# Patient Record
Sex: MALE | Race: WHITE | Employment: FULL TIME | URBAN - METROPOLITAN AREA
[De-identification: names, ages, dates, MRNs, and addresses within clinical notes are randomized per-mention and may not be internally consistent; named-entity substitution may affect disease eponyms.]

---

## 2020-02-11 ENCOUNTER — OFFICE VISIT (OUTPATIENT)
Dept: FAMILY MEDICINE CLINIC | Facility: CLINIC | Age: 40
End: 2020-02-11
Payer: COMMERCIAL

## 2020-02-11 VITALS
HEART RATE: 76 BPM | HEIGHT: 65.3 IN | DIASTOLIC BLOOD PRESSURE: 73 MMHG | TEMPERATURE: 98 F | BODY MASS INDEX: 35.39 KG/M2 | WEIGHT: 215 LBS | SYSTOLIC BLOOD PRESSURE: 125 MMHG

## 2020-02-11 DIAGNOSIS — Z00.00 ROUTINE GENERAL MEDICAL EXAMINATION AT A HEALTH CARE FACILITY: Primary | ICD-10-CM

## 2020-02-11 PROCEDURE — 99385 PREV VISIT NEW AGE 18-39: CPT | Performed by: FAMILY MEDICINE

## 2020-02-12 NOTE — PROGRESS NOTES
HPI:    Patient ID: Janna Ly is a 46year old male.     HPI  Patient presents with:  Physical: annual physical   Ear Problem: LT ear feels like movement, nothing comes out, same with RT ear,    Lightheadedness: after getting out of gym will feel ligh Visit:  Requested Prescriptions      No prescriptions requested or ordered in this encounter       Imaging & Referrals:  None       #[id number]

## 2020-02-14 ENCOUNTER — APPOINTMENT (OUTPATIENT)
Dept: LAB | Age: 40
End: 2020-02-14
Attending: FAMILY MEDICINE
Payer: COMMERCIAL

## 2020-02-14 LAB
ALBUMIN SERPL-MCNC: 4.3 G/DL (ref 3.4–5)
ALBUMIN/GLOB SERPL: 1 {RATIO} (ref 1–2)
ALP LIVER SERPL-CCNC: 116 U/L (ref 45–117)
ALT SERPL-CCNC: 41 U/L (ref 16–61)
ANION GAP SERPL CALC-SCNC: 4 MMOL/L (ref 0–18)
AST SERPL-CCNC: 26 U/L (ref 15–37)
BASOPHILS # BLD AUTO: 0.02 X10(3) UL (ref 0–0.2)
BASOPHILS NFR BLD AUTO: 0.3 %
BILIRUB SERPL-MCNC: 0.4 MG/DL (ref 0.1–2)
BUN BLD-MCNC: 16 MG/DL (ref 7–18)
BUN/CREAT SERPL: 16.2 (ref 10–20)
CALCIUM BLD-MCNC: 9.6 MG/DL (ref 8.5–10.1)
CHLORIDE SERPL-SCNC: 106 MMOL/L (ref 98–112)
CHOLEST SMN-MCNC: 221 MG/DL (ref ?–200)
CO2 SERPL-SCNC: 30 MMOL/L (ref 21–32)
CREAT BLD-MCNC: 0.99 MG/DL (ref 0.7–1.3)
DEPRECATED RDW RBC AUTO: 41.7 FL (ref 35.1–46.3)
EOSINOPHIL # BLD AUTO: 0.1 X10(3) UL (ref 0–0.7)
EOSINOPHIL NFR BLD AUTO: 1.3 %
ERYTHROCYTE [DISTWIDTH] IN BLOOD BY AUTOMATED COUNT: 12.8 % (ref 11–15)
GLOBULIN PLAS-MCNC: 4.2 G/DL (ref 2.8–4.4)
GLUCOSE BLD-MCNC: 104 MG/DL (ref 70–99)
HCT VFR BLD AUTO: 48.5 % (ref 39–53)
HDLC SERPL-MCNC: 52 MG/DL (ref 40–59)
HGB BLD-MCNC: 16.1 G/DL (ref 13–17.5)
IMM GRANULOCYTES # BLD AUTO: 0.02 X10(3) UL (ref 0–1)
IMM GRANULOCYTES NFR BLD: 0.3 %
LDLC SERPL CALC-MCNC: 151 MG/DL (ref ?–100)
LYMPHOCYTES # BLD AUTO: 2.07 X10(3) UL (ref 1–4)
LYMPHOCYTES NFR BLD AUTO: 26.7 %
M PROTEIN MFR SERPL ELPH: 8.5 G/DL (ref 6.4–8.2)
MCH RBC QN AUTO: 29.7 PG (ref 26–34)
MCHC RBC AUTO-ENTMCNC: 33.2 G/DL (ref 31–37)
MCV RBC AUTO: 89.5 FL (ref 80–100)
MONOCYTES # BLD AUTO: 0.55 X10(3) UL (ref 0.1–1)
MONOCYTES NFR BLD AUTO: 7.1 %
NEUTROPHILS # BLD AUTO: 4.99 X10 (3) UL (ref 1.5–7.7)
NEUTROPHILS # BLD AUTO: 4.99 X10(3) UL (ref 1.5–7.7)
NEUTROPHILS NFR BLD AUTO: 64.3 %
NONHDLC SERPL-MCNC: 169 MG/DL (ref ?–130)
OSMOLALITY SERPL CALC.SUM OF ELEC: 291 MOSM/KG (ref 275–295)
PATIENT FASTING Y/N/NP: YES
PATIENT FASTING Y/N/NP: YES
PLATELET # BLD AUTO: 286 10(3)UL (ref 150–450)
POTASSIUM SERPL-SCNC: 4.7 MMOL/L (ref 3.5–5.1)
RBC # BLD AUTO: 5.42 X10(6)UL (ref 4.3–5.7)
SODIUM SERPL-SCNC: 140 MMOL/L (ref 136–145)
TRIGL SERPL-MCNC: 90 MG/DL (ref 30–149)
VLDLC SERPL CALC-MCNC: 18 MG/DL (ref 0–30)
WBC # BLD AUTO: 7.8 X10(3) UL (ref 4–11)

## 2020-02-14 PROCEDURE — 80061 LIPID PANEL: CPT | Performed by: FAMILY MEDICINE

## 2020-02-14 PROCEDURE — 36415 COLL VENOUS BLD VENIPUNCTURE: CPT | Performed by: FAMILY MEDICINE

## 2020-02-14 PROCEDURE — 80053 COMPREHEN METABOLIC PANEL: CPT | Performed by: FAMILY MEDICINE

## 2020-02-14 PROCEDURE — 85025 COMPLETE CBC W/AUTO DIFF WBC: CPT | Performed by: FAMILY MEDICINE

## 2021-05-30 ENCOUNTER — IMMUNIZATION (OUTPATIENT)
Dept: LAB | Facility: HOSPITAL | Age: 41
End: 2021-05-30
Attending: EMERGENCY MEDICINE
Payer: COMMERCIAL

## 2021-05-30 DIAGNOSIS — Z23 NEED FOR VACCINATION: Primary | ICD-10-CM

## 2021-05-30 PROCEDURE — 0001A SARSCOV2 VAC 30MCG/0.3ML IM: CPT

## 2021-06-03 ENCOUNTER — HOSPITAL ENCOUNTER (OUTPATIENT)
Dept: GENERAL RADIOLOGY | Age: 41
Discharge: HOME | End: 2021-06-03
Attending: FAMILY MEDICINE
Payer: COMMERCIAL

## 2021-06-03 ENCOUNTER — OFFICE VISIT (OUTPATIENT)
Dept: FAMILY MEDICINE CLINIC | Facility: CLINIC | Age: 41
End: 2021-06-03
Payer: COMMERCIAL

## 2021-06-03 VITALS
WEIGHT: 227 LBS | DIASTOLIC BLOOD PRESSURE: 83 MMHG | RESPIRATION RATE: 20 BRPM | BODY MASS INDEX: 37.82 KG/M2 | SYSTOLIC BLOOD PRESSURE: 129 MMHG | HEIGHT: 65 IN | HEART RATE: 91 BPM | TEMPERATURE: 97 F

## 2021-06-03 DIAGNOSIS — R30.0 DYSURIA: ICD-10-CM

## 2021-06-03 DIAGNOSIS — R30.9 PAINFUL URINATION: Primary | ICD-10-CM

## 2021-06-03 PROCEDURE — 99214 OFFICE O/P EST MOD 30 MIN: CPT | Performed by: FAMILY MEDICINE

## 2021-06-03 PROCEDURE — 74018 RADEX ABDOMEN 1 VIEW: CPT | Performed by: FAMILY MEDICINE

## 2021-06-03 PROCEDURE — 3079F DIAST BP 80-89 MM HG: CPT | Performed by: FAMILY MEDICINE

## 2021-06-03 PROCEDURE — 81002 URINALYSIS NONAUTO W/O SCOPE: CPT | Performed by: FAMILY MEDICINE

## 2021-06-03 PROCEDURE — 3008F BODY MASS INDEX DOCD: CPT | Performed by: FAMILY MEDICINE

## 2021-06-03 PROCEDURE — 3074F SYST BP LT 130 MM HG: CPT | Performed by: FAMILY MEDICINE

## 2021-06-03 RX ORDER — CIPROFLOXACIN 500 MG/1
500 TABLET, FILM COATED ORAL 2 TIMES DAILY
Qty: 10 TABLET | Refills: 0 | Status: SHIPPED | OUTPATIENT
Start: 2021-06-03

## 2021-06-03 NOTE — PROGRESS NOTES
HPI/Subjective:   Patient ID: Tere Hartman is a 45year old male. HPI  Patient presents with:  Pain: LLQ   Constipation  Painful Urination  2 days hist of pelvic pain and pain when finishing urination.    History/Other:   Review of Systems   Constitut

## 2021-06-20 ENCOUNTER — IMMUNIZATION (OUTPATIENT)
Dept: LAB | Facility: HOSPITAL | Age: 41
End: 2021-06-20
Attending: EMERGENCY MEDICINE
Payer: COMMERCIAL

## 2021-06-20 DIAGNOSIS — Z23 NEED FOR VACCINATION: Primary | ICD-10-CM

## 2021-06-20 PROCEDURE — 0002A SARSCOV2 VAC 30MCG/0.3ML IM: CPT

## 2022-05-09 ENCOUNTER — NURSE TRIAGE (OUTPATIENT)
Dept: FAMILY MEDICINE CLINIC | Facility: CLINIC | Age: 42
End: 2022-05-09

## 2022-05-10 RX ORDER — CIPROFLOXACIN 500 MG/1
500 TABLET, FILM COATED ORAL 2 TIMES DAILY
Qty: 10 TABLET | Refills: 0 | OUTPATIENT
Start: 2022-05-10

## 2022-05-14 ENCOUNTER — LAB ENCOUNTER (OUTPATIENT)
Dept: LAB | Facility: HOSPITAL | Age: 42
End: 2022-05-14
Attending: FAMILY MEDICINE
Payer: COMMERCIAL

## 2022-05-14 DIAGNOSIS — R30.9 PAINFUL URINATION: ICD-10-CM

## 2022-05-14 LAB
BILIRUB UR QL: NEGATIVE
CLARITY UR: CLEAR
COLOR UR: YELLOW
GLUCOSE UR-MCNC: NEGATIVE MG/DL
HGB UR QL STRIP.AUTO: NEGATIVE
LEUKOCYTE ESTERASE UR QL STRIP.AUTO: NEGATIVE
NITRITE UR QL STRIP.AUTO: NEGATIVE
PH UR: 5 [PH] (ref 5–8)
PROT UR-MCNC: 30 MG/DL
SP GR UR STRIP: 1.03 (ref 1–1.03)
UROBILINOGEN UR STRIP-ACNC: 2
VIT C UR-MCNC: NEGATIVE MG/DL

## 2022-05-14 PROCEDURE — 87086 URINE CULTURE/COLONY COUNT: CPT

## 2022-05-14 PROCEDURE — 81001 URINALYSIS AUTO W/SCOPE: CPT

## 2023-02-07 ENCOUNTER — LAB ENCOUNTER (OUTPATIENT)
Dept: LAB | Age: 43
End: 2023-02-07
Attending: FAMILY MEDICINE
Payer: COMMERCIAL

## 2023-02-07 ENCOUNTER — OFFICE VISIT (OUTPATIENT)
Dept: FAMILY MEDICINE CLINIC | Facility: CLINIC | Age: 43
End: 2023-02-07

## 2023-02-07 VITALS
HEIGHT: 65 IN | SYSTOLIC BLOOD PRESSURE: 139 MMHG | HEART RATE: 80 BPM | DIASTOLIC BLOOD PRESSURE: 80 MMHG | WEIGHT: 241.19 LBS | BODY MASS INDEX: 40.18 KG/M2

## 2023-02-07 DIAGNOSIS — R10.2 ABDOMINAL PAIN, SUPRAPUBIC: Primary | ICD-10-CM

## 2023-02-07 DIAGNOSIS — K59.01 SLOW TRANSIT CONSTIPATION: ICD-10-CM

## 2023-02-07 DIAGNOSIS — R10.2 ABDOMINAL PAIN, SUPRAPUBIC: ICD-10-CM

## 2023-02-07 LAB
ALBUMIN SERPL-MCNC: 4.2 G/DL (ref 3.4–5)
ALBUMIN/GLOB SERPL: 1 {RATIO} (ref 1–2)
ALP LIVER SERPL-CCNC: 111 U/L
ALT SERPL-CCNC: 97 U/L
ANION GAP SERPL CALC-SCNC: 5 MMOL/L (ref 0–18)
AST SERPL-CCNC: 47 U/L (ref 15–37)
BILIRUB SERPL-MCNC: 0.7 MG/DL (ref 0.1–2)
BILIRUB UR QL: NEGATIVE
BUN BLD-MCNC: 17 MG/DL (ref 7–18)
BUN/CREAT SERPL: 16.2 (ref 10–20)
CALCIUM BLD-MCNC: 9.4 MG/DL (ref 8.5–10.1)
CHLORIDE SERPL-SCNC: 108 MMOL/L (ref 98–112)
CLARITY UR: CLEAR
CO2 SERPL-SCNC: 27 MMOL/L (ref 21–32)
COLOR UR: YELLOW
CREAT BLD-MCNC: 1.05 MG/DL
FASTING STATUS PATIENT QL REPORTED: YES
GFR SERPLBLD BASED ON 1.73 SQ M-ARVRAT: 91 ML/MIN/1.73M2 (ref 60–?)
GLOBULIN PLAS-MCNC: 4.2 G/DL (ref 2.8–4.4)
GLUCOSE BLD-MCNC: 103 MG/DL (ref 70–99)
GLUCOSE UR-MCNC: NEGATIVE MG/DL
HGB UR QL STRIP.AUTO: NEGATIVE
HYALINE CASTS #/AREA URNS AUTO: PRESENT /LPF
KETONES UR-MCNC: NEGATIVE MG/DL
LEUKOCYTE ESTERASE UR QL STRIP.AUTO: NEGATIVE
NITRITE UR QL STRIP.AUTO: NEGATIVE
OSMOLALITY SERPL CALC.SUM OF ELEC: 292 MOSM/KG (ref 275–295)
PH UR: 5.5 [PH] (ref 5–8)
POTASSIUM SERPL-SCNC: 4.6 MMOL/L (ref 3.5–5.1)
PROT SERPL-MCNC: 8.4 G/DL (ref 6.4–8.2)
SODIUM SERPL-SCNC: 140 MMOL/L (ref 136–145)
SP GR UR STRIP: 1.02 (ref 1–1.03)
UROBILINOGEN UR STRIP-ACNC: 0.2

## 2023-02-07 PROCEDURE — 99213 OFFICE O/P EST LOW 20 MIN: CPT | Performed by: FAMILY MEDICINE

## 2023-02-07 PROCEDURE — 80053 COMPREHEN METABOLIC PANEL: CPT

## 2023-02-07 PROCEDURE — 81015 MICROSCOPIC EXAM OF URINE: CPT

## 2023-02-07 PROCEDURE — 3079F DIAST BP 80-89 MM HG: CPT | Performed by: FAMILY MEDICINE

## 2023-02-07 PROCEDURE — 81001 URINALYSIS AUTO W/SCOPE: CPT

## 2023-02-07 PROCEDURE — 36415 COLL VENOUS BLD VENIPUNCTURE: CPT

## 2023-02-07 PROCEDURE — 3075F SYST BP GE 130 - 139MM HG: CPT | Performed by: FAMILY MEDICINE

## 2023-02-07 PROCEDURE — 3008F BODY MASS INDEX DOCD: CPT | Performed by: FAMILY MEDICINE

## 2023-02-07 RX ORDER — POLYETHYLENE GLYCOL 3350 17 G/17G
17 POWDER, FOR SOLUTION ORAL DAILY
Qty: 30 EACH | Refills: 0 | Status: SHIPPED | OUTPATIENT
Start: 2023-02-07

## 2023-02-18 ENCOUNTER — HOSPITAL ENCOUNTER (OUTPATIENT)
Dept: CT IMAGING | Facility: HOSPITAL | Age: 43
Discharge: HOME | End: 2023-02-18
Attending: FAMILY MEDICINE
Payer: COMMERCIAL

## 2023-02-18 DIAGNOSIS — R10.2 ABDOMINAL PAIN, SUPRAPUBIC: ICD-10-CM

## 2023-02-18 PROCEDURE — 74177 CT ABD & PELVIS W/CONTRAST: CPT | Performed by: FAMILY MEDICINE

## 2023-02-22 ENCOUNTER — OFFICE VISIT (OUTPATIENT)
Dept: FAMILY MEDICINE CLINIC | Facility: CLINIC | Age: 43
End: 2023-02-22

## 2023-02-22 VITALS
BODY MASS INDEX: 39.15 KG/M2 | SYSTOLIC BLOOD PRESSURE: 133 MMHG | HEIGHT: 65 IN | DIASTOLIC BLOOD PRESSURE: 86 MMHG | HEART RATE: 80 BPM | WEIGHT: 235 LBS

## 2023-02-22 DIAGNOSIS — K76.0 FATTY LIVER: ICD-10-CM

## 2023-02-22 DIAGNOSIS — K57.90 DIVERTICULOSIS: ICD-10-CM

## 2023-02-22 DIAGNOSIS — R10.9 ABDOMINAL PAIN, UNSPECIFIED ABDOMINAL LOCATION: Primary | ICD-10-CM

## 2023-02-22 PROCEDURE — 3008F BODY MASS INDEX DOCD: CPT | Performed by: FAMILY MEDICINE

## 2023-02-22 PROCEDURE — 3075F SYST BP GE 130 - 139MM HG: CPT | Performed by: FAMILY MEDICINE

## 2023-02-22 PROCEDURE — 99213 OFFICE O/P EST LOW 20 MIN: CPT | Performed by: FAMILY MEDICINE

## 2023-02-22 PROCEDURE — 3079F DIAST BP 80-89 MM HG: CPT | Performed by: FAMILY MEDICINE

## 2023-03-31 ENCOUNTER — OFFICE VISIT (OUTPATIENT)
Dept: FAMILY MEDICINE CLINIC | Facility: CLINIC | Age: 43
End: 2023-03-31

## 2023-03-31 DIAGNOSIS — L91.8 SKIN TAG: Primary | ICD-10-CM

## 2023-03-31 PROCEDURE — 3078F DIAST BP <80 MM HG: CPT | Performed by: FAMILY MEDICINE

## 2023-03-31 PROCEDURE — 11200 RMVL SKIN TAGS UP TO&INC 15: CPT | Performed by: FAMILY MEDICINE

## 2023-03-31 PROCEDURE — 3074F SYST BP LT 130 MM HG: CPT | Performed by: FAMILY MEDICINE

## 2023-03-31 PROCEDURE — 3008F BODY MASS INDEX DOCD: CPT | Performed by: FAMILY MEDICINE

## 2023-04-01 VITALS
BODY MASS INDEX: 37.29 KG/M2 | HEIGHT: 65 IN | WEIGHT: 223.81 LBS | HEART RATE: 72 BPM | SYSTOLIC BLOOD PRESSURE: 120 MMHG | DIASTOLIC BLOOD PRESSURE: 71 MMHG

## 2024-04-02 ENCOUNTER — TELEPHONE (OUTPATIENT)
Dept: FAMILY MEDICINE CLINIC | Facility: CLINIC | Age: 44
End: 2024-04-02

## 2024-04-02 DIAGNOSIS — Z23 NEED FOR VACCINATION: Primary | ICD-10-CM

## 2024-04-02 NOTE — TELEPHONE ENCOUNTER
Patient scheduled an apt for 4/3/24 for Tdap vaccine. No orders on file, please sign order if you agree.     Last seen 3/31/23

## 2024-04-02 NOTE — TELEPHONE ENCOUNTER
Call made, no answer.     If call is returned please inform pt apt for nurse visit needs to be canceled, per Dr can Tdap vaccine can be administered the same day pt scheduled apt for a physical 5/14/24    See message below

## 2024-04-02 NOTE — TELEPHONE ENCOUNTER
FYI: per patient her wife is going to have a baby that's why he's going to have his Tdap tomorrow before his physical on 05/14/2024. Please call patient if he can come tomorrow.

## 2024-04-03 ENCOUNTER — NURSE ONLY (OUTPATIENT)
Dept: FAMILY MEDICINE CLINIC | Facility: CLINIC | Age: 44
End: 2024-04-03
Payer: COMMERCIAL

## 2024-04-03 DIAGNOSIS — Z23 NEED FOR VACCINATION: Primary | ICD-10-CM

## 2024-04-03 PROCEDURE — 90715 TDAP VACCINE 7 YRS/> IM: CPT | Performed by: FAMILY MEDICINE

## 2024-04-03 PROCEDURE — 90471 IMMUNIZATION ADMIN: CPT | Performed by: FAMILY MEDICINE

## 2024-04-03 NOTE — TELEPHONE ENCOUNTER
Phone call made s/t pt advise he can keep nurse visit appointment for 4/3 Tdap vaccine has been approved by Dr Gu. Pt verbalized understanding.

## 2024-05-14 ENCOUNTER — OFFICE VISIT (OUTPATIENT)
Dept: FAMILY MEDICINE CLINIC | Facility: CLINIC | Age: 44
End: 2024-05-14

## 2024-05-14 VITALS
WEIGHT: 239 LBS | DIASTOLIC BLOOD PRESSURE: 88 MMHG | HEART RATE: 80 BPM | HEIGHT: 65 IN | BODY MASS INDEX: 39.82 KG/M2 | SYSTOLIC BLOOD PRESSURE: 139 MMHG

## 2024-05-14 DIAGNOSIS — E66.09 CLASS 2 OBESITY DUE TO EXCESS CALORIES WITHOUT SERIOUS COMORBIDITY WITH BODY MASS INDEX (BMI) OF 39.0 TO 39.9 IN ADULT: ICD-10-CM

## 2024-05-14 DIAGNOSIS — Z00.00 PHYSICAL EXAM: Primary | ICD-10-CM

## 2024-05-14 PROCEDURE — 3075F SYST BP GE 130 - 139MM HG: CPT | Performed by: FAMILY MEDICINE

## 2024-05-14 PROCEDURE — 3008F BODY MASS INDEX DOCD: CPT | Performed by: FAMILY MEDICINE

## 2024-05-14 PROCEDURE — 99396 PREV VISIT EST AGE 40-64: CPT | Performed by: FAMILY MEDICINE

## 2024-05-14 PROCEDURE — 3079F DIAST BP 80-89 MM HG: CPT | Performed by: FAMILY MEDICINE

## 2024-05-14 NOTE — PROGRESS NOTES
5/14/2024  9:16 AM    Andres Hurt is a 43 year old male.    Chief complaint(s):   Chief Complaint   Patient presents with    Routine Physical     HPI:     Andres Hurt primary complaint is regarding CPE.       Andres Hurt is a 43 year old male is here for routine periodic health screening and examination.  His last physical exam was many years ago.  His last ECG was none . His last diabetes screening test was 2 years ago and was normal.   His last cholesterol test was 2 year ago and was normal.  Last dentist visit was 1months ago.  He is current with his Td immunization. Patient last colonoscopy was none. He is not a smoker.        HISTORY:  No past medical history on file.   No past surgical history on file.   Family History   Problem Relation Age of Onset    No Known Problems Father     No Known Problems Mother     No Known Problems Maternal Grandmother     No Known Problems Maternal Grandfather     No Known Problems Paternal Grandmother     No Known Problems Paternal Grandfather     Other (Other) Sister         fibromyalgia       Social History:   Social History     Socioeconomic History    Marital status: Single   Tobacco Use    Smoking status: Never     Passive exposure: Never    Smokeless tobacco: Never   Vaping Use    Vaping status: Never Used   Substance and Sexual Activity    Alcohol use: Yes    Drug use: No        Immunizations:   Immunization History   Administered Date(s) Administered    Covid-19 Vaccine Pfizer 30 mcg/0.3 ml 05/30/2021, 06/20/2021    TDAP 04/03/2024       Medications (Active prior to today's visit):  No current outpatient medications on file.       Allergies:  No Known Allergies      ROS:   Review of Systems   Constitutional:  Negative for appetite change, fatigue and fever.   HENT:  Negative for hearing loss and nosebleeds.    Eyes:  Negative for pain and visual disturbance.   Respiratory:  Negative for apnea and shortness of breath.    Cardiovascular:  Negative for chest  pain, palpitations and leg swelling.   Gastrointestinal:  Negative for abdominal pain, blood in stool, constipation, diarrhea, nausea and vomiting.   Endocrine: Negative for polydipsia and polyuria.   Genitourinary:  Negative for decreased urine volume, frequency and hematuria.        No nocturia   Musculoskeletal:  Negative for arthralgias.   Skin:  Negative for rash.   Neurological:  Negative for dizziness, syncope and headaches.   Psychiatric/Behavioral:  Negative for dysphoric mood and sleep disturbance.        PHYSICAL EXAM:   VS: /88   Pulse 80   Ht 5' 5\" (1.651 m)   Wt 239 lb (108.4 kg)   BMI 39.77 kg/m²     Physical Exam  Vitals reviewed.   Constitutional:       Appearance: Normal appearance.      Comments:      HENT:      Head: Normocephalic.      Right Ear: Hearing, tympanic membrane and ear canal normal.      Left Ear: Hearing, tympanic membrane and ear canal normal.      Nose: Nose normal. No rhinorrhea.      Mouth/Throat:      Mouth: Mucous membranes are moist.      Pharynx: Oropharynx is clear.   Eyes:      Extraocular Movements: Extraocular movements intact.      Conjunctiva/sclera: Conjunctivae normal.      Pupils: Pupils are equal, round, and reactive to light.   Neck:      Thyroid: No thyroid mass.      Vascular: No carotid bruit.   Cardiovascular:      Rate and Rhythm: Normal rate and regular rhythm.      Heart sounds: Normal heart sounds, S1 normal and S2 normal. No murmur heard.  Pulmonary:      Effort: Pulmonary effort is normal.      Breath sounds: Normal breath sounds.   Abdominal:      General: Abdomen is flat. Bowel sounds are normal.      Palpations: Abdomen is soft. There is no hepatomegaly, splenomegaly or mass.      Tenderness: There is no abdominal tenderness.      Hernia: No hernia is present.   Musculoskeletal:      Cervical back: Neck supple.      Comments: Spinal exam without scoliosis.      Lymphadenopathy:      Cervical: No cervical adenopathy.   Skin:     General: Skin  is warm.      Findings: No rash.   Neurological:      General: No focal deficit present.      Mental Status: He is alert.      Deep Tendon Reflexes:      Reflex Scores:       Patellar reflexes are 2+ on the right side and 2+ on the left side.  Psychiatric:         Attention and Perception: Attention normal.         Mood and Affect: Mood and affect normal.         LABORATORY RESULTS:       EKG / Spirometry : -     Radiology: No results found.     ASSESSMENT/PLAN:   Assessment   Encounter Diagnoses   Name Primary?    Physical exam Yes    Class 2 obesity due to excess calories without serious comorbidity with body mass index (BMI) of 39.0 to 39.9 in adult          CPE PLAN:    LABS / TEST & ORDERS for today's visit :Blood test(s) ordered today for send out to [facility] lab:  Orders Placed This Encounter   Procedures    CBC With Differential With Platelet    Comp Metabolic Panel (14)    Hemoglobin A1C    Lipid Panel    TSH W Reflex To Free T4    Vitamin D    Urinalysis with Culture Reflex   Referrals: EKG 12-LEAD  In-House; Urine dip.  Test/Procedures done today include: EKG.    IMMUNIZATIONS: none given today.    RECOMMENDATIONS given include: ANTICIPATORY GUIDANCE  topics covered today include: safety (i.e. seat belts, helmets, sunscreen, protective sports gear ), nutrition (i.e. healthy meals and snacks (i.e. avoid junk food and high-carbohydrate foods); athletic conditioning, fluids; low fat milk, limit to less than 20 oz. a day; dental care ), and Healthy habits& Social competence & Responsibilities: Recommendations on physical activity; exercise daily or at least 3 times a week for 30-60 minutes doing cardiovascular exercise. Encourage to maintain the best physical and dental hygiene possible.  Consider a  if overweight and/or having difficult in staying active; attempt to keep a schedule that includes an adequate sleep and physical exercise / activities Patient educated on doing regular  self testicular exam. Patient was educated on sexual transmitted disease. Best to abstain from sexual intercourse until he is ready to form a family. Use of condoms may prevent transmission of infections as well as pregnancy.    FOLLOW-UP: Schedule a follow-up visit in 12 months.          Orders This Visit:  Orders Placed This Encounter   Procedures    CBC With Differential With Platelet    Comp Metabolic Panel (14)    Hemoglobin A1C    Lipid Panel    TSH W Reflex To Free T4    Vitamin D    Urinalysis with Culture Reflex       Meds This Visit:  Requested Prescriptions      No prescriptions requested or ordered in this encounter       Imaging & Referrals:  EKG 12-LEAD         SEVEN FRANCOIS MD

## 2024-05-16 ENCOUNTER — LAB ENCOUNTER (OUTPATIENT)
Dept: LAB | Age: 44
End: 2024-05-16
Attending: FAMILY MEDICINE

## 2024-05-16 ENCOUNTER — EKG ENCOUNTER (OUTPATIENT)
Dept: LAB | Age: 44
End: 2024-05-16
Attending: FAMILY MEDICINE

## 2024-05-16 DIAGNOSIS — Z00.00 PHYSICAL EXAM: ICD-10-CM

## 2024-05-16 LAB
ALBUMIN SERPL-MCNC: 4.6 G/DL (ref 3.2–4.8)
ALBUMIN/GLOB SERPL: 1.2 {RATIO} (ref 1–2)
ALP LIVER SERPL-CCNC: 114 U/L
ALT SERPL-CCNC: 36 U/L
ANION GAP SERPL CALC-SCNC: 7 MMOL/L (ref 0–18)
AST SERPL-CCNC: 26 U/L (ref ?–34)
ATRIAL RATE: 74 BPM
BASOPHILS # BLD AUTO: 0.04 X10(3) UL (ref 0–0.2)
BASOPHILS NFR BLD AUTO: 0.5 %
BILIRUB SERPL-MCNC: 0.7 MG/DL (ref 0.3–1.2)
BILIRUB UR QL: NEGATIVE
BUN BLD-MCNC: 12 MG/DL (ref 9–23)
BUN/CREAT SERPL: 12.5 (ref 10–20)
CALCIUM BLD-MCNC: 9.4 MG/DL (ref 8.7–10.4)
CHLORIDE SERPL-SCNC: 108 MMOL/L (ref 98–112)
CHOLEST SERPL-MCNC: 214 MG/DL (ref ?–200)
CLARITY UR: CLEAR
CO2 SERPL-SCNC: 26 MMOL/L (ref 21–32)
CREAT BLD-MCNC: 0.96 MG/DL
DEPRECATED RDW RBC AUTO: 44.5 FL (ref 35.1–46.3)
EGFRCR SERPLBLD CKD-EPI 2021: 101 ML/MIN/1.73M2 (ref 60–?)
EOSINOPHIL # BLD AUTO: 0.06 X10(3) UL (ref 0–0.7)
EOSINOPHIL NFR BLD AUTO: 0.7 %
ERYTHROCYTE [DISTWIDTH] IN BLOOD BY AUTOMATED COUNT: 13.7 % (ref 11–15)
EST. AVERAGE GLUCOSE BLD GHB EST-MCNC: 100 MG/DL (ref 68–126)
FASTING PATIENT LIPID ANSWER: YES
FASTING STATUS PATIENT QL REPORTED: YES
GLOBULIN PLAS-MCNC: 3.7 G/DL (ref 2–3.5)
GLUCOSE BLD-MCNC: 95 MG/DL (ref 70–99)
GLUCOSE UR-MCNC: NORMAL MG/DL
HBA1C MFR BLD: 5.1 % (ref ?–5.7)
HCT VFR BLD AUTO: 49.9 %
HDLC SERPL-MCNC: 40 MG/DL (ref 40–59)
HGB BLD-MCNC: 16 G/DL
IMM GRANULOCYTES # BLD AUTO: 0.02 X10(3) UL (ref 0–1)
IMM GRANULOCYTES NFR BLD: 0.2 %
KETONES UR-MCNC: NEGATIVE MG/DL
LDLC SERPL CALC-MCNC: 145 MG/DL (ref ?–100)
LEUKOCYTE ESTERASE UR QL STRIP.AUTO: NEGATIVE
LYMPHOCYTES # BLD AUTO: 2.37 X10(3) UL (ref 1–4)
LYMPHOCYTES NFR BLD AUTO: 26.8 %
MCH RBC QN AUTO: 28.4 PG (ref 26–34)
MCHC RBC AUTO-ENTMCNC: 32.1 G/DL (ref 31–37)
MCV RBC AUTO: 88.6 FL
MONOCYTES # BLD AUTO: 0.51 X10(3) UL (ref 0.1–1)
MONOCYTES NFR BLD AUTO: 5.8 %
NEUTROPHILS # BLD AUTO: 5.84 X10 (3) UL (ref 1.5–7.7)
NEUTROPHILS # BLD AUTO: 5.84 X10(3) UL (ref 1.5–7.7)
NEUTROPHILS NFR BLD AUTO: 66 %
NITRITE UR QL STRIP.AUTO: NEGATIVE
NONHDLC SERPL-MCNC: 174 MG/DL (ref ?–130)
OSMOLALITY SERPL CALC.SUM OF ELEC: 292 MOSM/KG (ref 275–295)
P AXIS: 19 DEGREES
P-R INTERVAL: 190 MS
PH UR: 5 [PH] (ref 5–8)
PLATELET # BLD AUTO: 313 10(3)UL (ref 150–450)
POTASSIUM SERPL-SCNC: 4.2 MMOL/L (ref 3.5–5.1)
PROT SERPL-MCNC: 8.3 G/DL (ref 5.7–8.2)
PROT UR-MCNC: NEGATIVE MG/DL
Q-T INTERVAL: 368 MS
QRS DURATION: 102 MS
QTC CALCULATION (BEZET): 408 MS
R AXIS: 48 DEGREES
RBC # BLD AUTO: 5.63 X10(6)UL
SODIUM SERPL-SCNC: 141 MMOL/L (ref 136–145)
SP GR UR STRIP: 1.02 (ref 1–1.03)
T AXIS: 51 DEGREES
TRIGL SERPL-MCNC: 160 MG/DL (ref 30–149)
TSI SER-ACNC: 2.01 MIU/ML (ref 0.55–4.78)
UROBILINOGEN UR STRIP-ACNC: NORMAL
VENTRICULAR RATE: 74 BPM
VIT D+METAB SERPL-MCNC: 17 NG/ML (ref 30–100)
VLDLC SERPL CALC-MCNC: 30 MG/DL (ref 0–30)
WBC # BLD AUTO: 8.8 X10(3) UL (ref 4–11)

## 2024-05-16 PROCEDURE — 84443 ASSAY THYROID STIM HORMONE: CPT

## 2024-05-16 PROCEDURE — 85025 COMPLETE CBC W/AUTO DIFF WBC: CPT

## 2024-05-16 PROCEDURE — 81001 URINALYSIS AUTO W/SCOPE: CPT

## 2024-05-16 PROCEDURE — 80053 COMPREHEN METABOLIC PANEL: CPT

## 2024-05-16 PROCEDURE — 36415 COLL VENOUS BLD VENIPUNCTURE: CPT

## 2024-05-16 PROCEDURE — 83036 HEMOGLOBIN GLYCOSYLATED A1C: CPT

## 2024-05-16 PROCEDURE — 82306 VITAMIN D 25 HYDROXY: CPT

## 2024-05-16 PROCEDURE — 93005 ELECTROCARDIOGRAM TRACING: CPT

## 2024-05-16 PROCEDURE — 93010 ELECTROCARDIOGRAM REPORT: CPT | Performed by: STUDENT IN AN ORGANIZED HEALTH CARE EDUCATION/TRAINING PROGRAM

## 2024-05-16 PROCEDURE — 80061 LIPID PANEL: CPT

## 2024-05-16 RX ORDER — ERGOCALCIFEROL 1.25 MG/1
50000 CAPSULE ORAL WEEKLY
Qty: 12 CAPSULE | Refills: 3 | Status: SHIPPED | OUTPATIENT
Start: 2024-05-16 | End: 2025-05-16

## 2024-06-03 ENCOUNTER — NURSE TRIAGE (OUTPATIENT)
Dept: FAMILY MEDICINE CLINIC | Facility: CLINIC | Age: 44
End: 2024-06-03

## 2024-06-03 NOTE — TELEPHONE ENCOUNTER
Please reply to pool: EM RN TRIAGE  Action Requested: Summary for Provider     []  Critical Lab, Recommendations Needed  [] Need Additional Advice  [x]   FYI    []   Need Orders  [] Need Medications Sent to Pharmacy  []  Other     SUMMARY: Patient contacted regarding appointment for lower abdominal pain.  Symptoms present x 1 week.  Describes pain in the center, lower abdomen.  It occurs at the end of urination and then resolves.  Denies burning with urination, dark urine or hematuria.  He is urinating more frequently than usual.  Denies fever, body aches, chills, nausea, vomiting or constipation.  Acute visit moved to tomorrow 06/04.  Patient advised to proceed to immediate care or emergency room for any symptom progression prior to appointment. He verbalized understanding and compliance.     Reason for call: Acute  Onset: Data Unavailable                       Reason for Disposition   MILD pain (e.g., does not interfere with normal activities) and pain comes and goes (cramps) lasts > 48 hours  (Exception: This same abdominal pain is a chronic symptom recurrent or ongoing AND present > 4 weeks.)    Protocols used: Abdominal Pain - Male-A-[place]

## 2024-06-03 NOTE — TELEPHONE ENCOUNTER
Patient scheduled a mychart appointment noting the following on appointment notes    I am having lower abdomen pains. Painful urination and bowel movements    6/3/24 called patient and transfered to RN Triage - it appears the call transferred was not completed for triage.    Mychart message sent to patient.

## 2024-06-04 ENCOUNTER — OFFICE VISIT (OUTPATIENT)
Dept: INTERNAL MEDICINE CLINIC | Facility: CLINIC | Age: 44
End: 2024-06-04
Payer: COMMERCIAL

## 2024-06-04 VITALS
WEIGHT: 243 LBS | BODY MASS INDEX: 40.48 KG/M2 | DIASTOLIC BLOOD PRESSURE: 87 MMHG | SYSTOLIC BLOOD PRESSURE: 130 MMHG | HEART RATE: 78 BPM | HEIGHT: 65 IN | RESPIRATION RATE: 16 BRPM

## 2024-06-04 DIAGNOSIS — R10.32 LEFT LOWER QUADRANT ABDOMINAL PAIN: Primary | ICD-10-CM

## 2024-06-04 PROCEDURE — 3079F DIAST BP 80-89 MM HG: CPT | Performed by: NURSE PRACTITIONER

## 2024-06-04 PROCEDURE — 99204 OFFICE O/P NEW MOD 45 MIN: CPT | Performed by: NURSE PRACTITIONER

## 2024-06-04 PROCEDURE — 3008F BODY MASS INDEX DOCD: CPT | Performed by: NURSE PRACTITIONER

## 2024-06-04 PROCEDURE — 3075F SYST BP GE 130 - 139MM HG: CPT | Performed by: NURSE PRACTITIONER

## 2024-06-04 NOTE — PROGRESS NOTES
Andres Hurt is a 43 year old male.  Chief Complaint   Patient presents with    Pain     Lower abd pain      HPI:   He presents with lower abdominal pain.   No fevers.   The pain started on Saturday.   He rates the pain a 5/10.   He is taking motrin and tylenol with little relief.  The pain increases when having a BM and urinating.   No blood in the urine.   He has had urinary frequency.    He has had similar symptoms last year.  He had a CT scan of the abdomen/pelvis completed which was negative for any acute process.   Current Outpatient Medications   Medication Sig Dispense Refill    ergocalciferol 1.25 MG (78581 UT) Oral Cap Take 1 capsule (50,000 Units total) by mouth once a week. 12 capsule 3      No past medical history on file.   No past surgical history on file.   Social History:  Social History     Socioeconomic History    Marital status: Single   Tobacco Use    Smoking status: Never     Passive exposure: Never    Smokeless tobacco: Never   Vaping Use    Vaping status: Never Used   Substance and Sexual Activity    Alcohol use: Yes    Drug use: No      Family History   Problem Relation Age of Onset    No Known Problems Father     No Known Problems Mother     No Known Problems Maternal Grandmother     No Known Problems Maternal Grandfather     No Known Problems Paternal Grandmother     No Known Problems Paternal Grandfather     Other (Other) Sister         fibromyalgia       No Known Allergies     REVIEW OF SYSTEMS:     Review of Systems   Constitutional:  Negative for fever.   HENT: Negative.     Respiratory:  Negative for cough, shortness of breath and wheezing.    Cardiovascular:  Negative for chest pain.   Gastrointestinal:  Positive for abdominal pain (lower abdominal pain). Negative for constipation, diarrhea, nausea and vomiting.   Genitourinary:  Positive for frequency. Negative for dysuria and hematuria.   Musculoskeletal: Negative.    Skin: Negative.    Neurological: Negative.     Psychiatric/Behavioral: Negative.        Wt Readings from Last 5 Encounters:   06/04/24 243 lb (110.2 kg)   05/14/24 239 lb (108.4 kg)   03/31/23 223 lb 12.8 oz (101.5 kg)   02/22/23 235 lb (106.6 kg)   02/07/23 241 lb 3.2 oz (109.4 kg)     Body mass index is 40.44 kg/m².      EXAM:   /87   Pulse 78   Resp 16   Ht 5' 5\" (1.651 m)   Wt 243 lb (110.2 kg)   BMI 40.44 kg/m²     Physical Exam  Vitals reviewed.   Constitutional:       Appearance: Normal appearance.   HENT:      Head: Normocephalic.   Cardiovascular:      Rate and Rhythm: Normal rate and regular rhythm.      Pulses: Normal pulses.   Pulmonary:      Breath sounds: Normal breath sounds. No wheezing.   Abdominal:      General: Bowel sounds are normal.      Palpations: Abdomen is soft.      Tenderness: There is abdominal tenderness in the left lower quadrant.   Musculoskeletal:         General: No swelling. Normal range of motion.   Skin:     General: Skin is warm and dry.   Neurological:      Mental Status: He is alert and oriented to person, place, and time.   Psychiatric:         Mood and Affect: Mood normal.         Behavior: Behavior normal.            ASSESSMENT AND PLAN:   1. Left lower quadrant abdominal pain  - CBC With Differential With Platelet; Future  - Comp Metabolic Panel (14); Future  - Urinalysis with Culture Reflex  - CT ABDOMEN+PELVIS(CONTRAST ONLY)(CPT=74177); Future    Total time spent with patient and reviewing the chart: 30 minutes     The patient indicates understanding of these issues and agrees to the plan.  Return for if symptoms do not resolve.

## 2024-06-05 ENCOUNTER — LAB ENCOUNTER (OUTPATIENT)
Dept: LAB | Age: 44
End: 2024-06-05
Attending: NURSE PRACTITIONER
Payer: COMMERCIAL

## 2024-06-05 DIAGNOSIS — R10.32 LEFT LOWER QUADRANT ABDOMINAL PAIN: ICD-10-CM

## 2024-06-05 LAB
ALBUMIN SERPL-MCNC: 4.8 G/DL (ref 3.2–4.8)
ALBUMIN/GLOB SERPL: 1.5 {RATIO} (ref 1–2)
ALP LIVER SERPL-CCNC: 114 U/L
ALT SERPL-CCNC: 46 U/L
ANION GAP SERPL CALC-SCNC: 7 MMOL/L (ref 0–18)
AST SERPL-CCNC: 32 U/L (ref ?–34)
BASOPHILS # BLD AUTO: 0.03 X10(3) UL (ref 0–0.2)
BASOPHILS NFR BLD AUTO: 0.3 %
BILIRUB SERPL-MCNC: 0.5 MG/DL (ref 0.3–1.2)
BILIRUB UR QL: NEGATIVE
BUN BLD-MCNC: 14 MG/DL (ref 9–23)
BUN/CREAT SERPL: 13.9 (ref 10–20)
CALCIUM BLD-MCNC: 9.2 MG/DL (ref 8.7–10.4)
CHLORIDE SERPL-SCNC: 110 MMOL/L (ref 98–112)
CLARITY UR: CLEAR
CO2 SERPL-SCNC: 26 MMOL/L (ref 21–32)
COLOR UR: YELLOW
CREAT BLD-MCNC: 1.01 MG/DL
DEPRECATED RDW RBC AUTO: 41.1 FL (ref 35.1–46.3)
EGFRCR SERPLBLD CKD-EPI 2021: 95 ML/MIN/1.73M2 (ref 60–?)
EOSINOPHIL # BLD AUTO: 0.03 X10(3) UL (ref 0–0.7)
EOSINOPHIL NFR BLD AUTO: 0.3 %
ERYTHROCYTE [DISTWIDTH] IN BLOOD BY AUTOMATED COUNT: 13.2 % (ref 11–15)
FASTING STATUS PATIENT QL REPORTED: NO
GLOBULIN PLAS-MCNC: 3.3 G/DL (ref 2–3.5)
GLUCOSE BLD-MCNC: 98 MG/DL (ref 70–99)
GLUCOSE UR-MCNC: NORMAL MG/DL
HCT VFR BLD AUTO: 45.4 %
HGB BLD-MCNC: 15.5 G/DL
HGB UR QL STRIP.AUTO: NEGATIVE
IMM GRANULOCYTES # BLD AUTO: 0.02 X10(3) UL (ref 0–1)
IMM GRANULOCYTES NFR BLD: 0.2 %
KETONES UR-MCNC: NEGATIVE MG/DL
LEUKOCYTE ESTERASE UR QL STRIP.AUTO: NEGATIVE
LYMPHOCYTES # BLD AUTO: 2.21 X10(3) UL (ref 1–4)
LYMPHOCYTES NFR BLD AUTO: 22.4 %
MCH RBC QN AUTO: 29.6 PG (ref 26–34)
MCHC RBC AUTO-ENTMCNC: 34.1 G/DL (ref 31–37)
MCV RBC AUTO: 86.6 FL
MONOCYTES # BLD AUTO: 0.69 X10(3) UL (ref 0.1–1)
MONOCYTES NFR BLD AUTO: 7 %
NEUTROPHILS # BLD AUTO: 6.89 X10 (3) UL (ref 1.5–7.7)
NEUTROPHILS # BLD AUTO: 6.89 X10(3) UL (ref 1.5–7.7)
NEUTROPHILS NFR BLD AUTO: 69.8 %
NITRITE UR QL STRIP.AUTO: NEGATIVE
OSMOLALITY SERPL CALC.SUM OF ELEC: 296 MOSM/KG (ref 275–295)
PH UR: 6.5 [PH] (ref 5–8)
PLATELET # BLD AUTO: 299 10(3)UL (ref 150–450)
POTASSIUM SERPL-SCNC: 4.2 MMOL/L (ref 3.5–5.1)
PROT SERPL-MCNC: 8.1 G/DL (ref 5.7–8.2)
RBC # BLD AUTO: 5.24 X10(6)UL
SODIUM SERPL-SCNC: 143 MMOL/L (ref 136–145)
SP GR UR STRIP: 1.03 (ref 1–1.03)
UROBILINOGEN UR STRIP-ACNC: 2
WBC # BLD AUTO: 9.9 X10(3) UL (ref 4–11)

## 2024-06-05 PROCEDURE — 81003 URINALYSIS AUTO W/O SCOPE: CPT | Performed by: NURSE PRACTITIONER

## 2024-06-05 PROCEDURE — 36415 COLL VENOUS BLD VENIPUNCTURE: CPT

## 2024-06-05 PROCEDURE — 85025 COMPLETE CBC W/AUTO DIFF WBC: CPT

## 2024-06-05 PROCEDURE — 80053 COMPREHEN METABOLIC PANEL: CPT

## 2024-06-29 ENCOUNTER — HOSPITAL ENCOUNTER (OUTPATIENT)
Dept: CT IMAGING | Age: 44
Discharge: HOME | End: 2024-06-29
Attending: NURSE PRACTITIONER
Payer: COMMERCIAL

## 2024-06-29 DIAGNOSIS — R10.32 LEFT LOWER QUADRANT ABDOMINAL PAIN: ICD-10-CM

## 2024-06-29 PROCEDURE — 74177 CT ABD & PELVIS W/CONTRAST: CPT | Performed by: NURSE PRACTITIONER

## 2024-10-15 ENCOUNTER — HOSPITAL ENCOUNTER (OUTPATIENT)
Dept: GENERAL RADIOLOGY | Age: 44
Discharge: HOME | End: 2024-10-15
Attending: NURSE PRACTITIONER
Payer: COMMERCIAL

## 2024-10-15 ENCOUNTER — OFFICE VISIT (OUTPATIENT)
Dept: INTERNAL MEDICINE CLINIC | Facility: CLINIC | Age: 44
End: 2024-10-15

## 2024-10-15 VITALS
DIASTOLIC BLOOD PRESSURE: 78 MMHG | SYSTOLIC BLOOD PRESSURE: 121 MMHG | HEART RATE: 83 BPM | BODY MASS INDEX: 41.15 KG/M2 | HEIGHT: 65 IN | WEIGHT: 247 LBS

## 2024-10-15 DIAGNOSIS — M54.42 CHRONIC LEFT-SIDED LOW BACK PAIN WITH LEFT-SIDED SCIATICA: Primary | ICD-10-CM

## 2024-10-15 DIAGNOSIS — G89.29 CHRONIC LEFT-SIDED LOW BACK PAIN WITH LEFT-SIDED SCIATICA: Primary | ICD-10-CM

## 2024-10-15 DIAGNOSIS — M54.42 CHRONIC LEFT-SIDED LOW BACK PAIN WITH LEFT-SIDED SCIATICA: ICD-10-CM

## 2024-10-15 DIAGNOSIS — G89.29 CHRONIC LEFT-SIDED LOW BACK PAIN WITH LEFT-SIDED SCIATICA: ICD-10-CM

## 2024-10-15 DIAGNOSIS — B07.9 VERRUCA VULGARIS: ICD-10-CM

## 2024-10-15 PROCEDURE — 72110 X-RAY EXAM L-2 SPINE 4/>VWS: CPT | Performed by: NURSE PRACTITIONER

## 2024-10-15 PROCEDURE — 3078F DIAST BP <80 MM HG: CPT | Performed by: NURSE PRACTITIONER

## 2024-10-15 PROCEDURE — 3008F BODY MASS INDEX DOCD: CPT | Performed by: NURSE PRACTITIONER

## 2024-10-15 PROCEDURE — 3074F SYST BP LT 130 MM HG: CPT | Performed by: NURSE PRACTITIONER

## 2024-10-15 PROCEDURE — 99214 OFFICE O/P EST MOD 30 MIN: CPT | Performed by: NURSE PRACTITIONER

## 2024-10-15 NOTE — PROGRESS NOTES
Andres Hurt is a 44 year old male.  Chief Complaint   Patient presents with    Back Pain     On going issue for 2 year with injury but now pain is getting worse     HPI:   He presents with left sided lower back pain that radiates down his left leg. He has had pain for about 2 years. He had a work injury about 2 years ago. He did not have any imaging at the time of injury. He did not do PT. He has just taken medication for his symptoms.     The pain has been worsening. He is taking tylenol and or ibuprofen as needed for pain with some relief.     The pain increases with walking. He has increased in the morning. He states he feels stiff. He has increased pain with bending over.     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     Current Outpatient Medications   Medication Sig Dispense Refill    ergocalciferol 1.25 MG (65348 UT) Oral Cap Take 1 capsule (50,000 Units total) by mouth once a week. 12 capsule 3      No past medical history on file.   No past surgical history on file.   Social History:  Social History     Socioeconomic History    Marital status: Single   Tobacco Use    Smoking status: Never     Passive exposure: Never    Smokeless tobacco: Never   Vaping Use    Vaping status: Never Used   Substance and Sexual Activity    Alcohol use: Yes    Drug use: No      Family History   Problem Relation Age of Onset    No Known Problems Father     No Known Problems Mother     No Known Problems Maternal Grandmother     No Known Problems Maternal Grandfather     No Known Problems Paternal Grandmother     No Known Problems Paternal Grandfather     Other (Other) Sister         fibromyalgia       Allergies[1]     REVIEW OF SYSTEMS:     Review of Systems   Constitutional:  Negative for fever.   HENT: Negative.     Respiratory:  Negative for cough, shortness of breath and wheezing.    Cardiovascular:  Negative for chest pain.   Gastrointestinal: Negative.    Genitourinary: Negative.     Musculoskeletal:  Positive for back pain.   Skin:         Warts on bilateral index fingers (present for 3-4 yrs)   Neurological: Negative.    Psychiatric/Behavioral: Negative.        Wt Readings from Last 5 Encounters:   10/15/24 247 lb (112 kg)   06/04/24 243 lb (110.2 kg)   05/14/24 239 lb (108.4 kg)   03/31/23 223 lb 12.8 oz (101.5 kg)   02/22/23 235 lb (106.6 kg)     Body mass index is 41.1 kg/m².      EXAM:   /78 (BP Location: Right arm, Patient Position: Sitting, Cuff Size: large)   Pulse 83   Ht 5' 5\" (1.651 m)   Wt 247 lb (112 kg)   BMI 41.10 kg/m²     Physical Exam  Vitals reviewed.   Constitutional:       Appearance: Normal appearance.   HENT:      Head: Normocephalic.   Cardiovascular:      Rate and Rhythm: Normal rate and regular rhythm.      Pulses: Normal pulses.   Pulmonary:      Breath sounds: Normal breath sounds. No wheezing.   Musculoskeletal:         General: No swelling.      Lumbar back: No spasms or tenderness. Decreased range of motion.        Back:    Skin:     General: Skin is warm and dry.   Neurological:      Mental Status: He is alert and oriented to person, place, and time.   Psychiatric:         Mood and Affect: Mood normal.         Behavior: Behavior normal.            ASSESSMENT AND PLAN:   1. Chronic left-sided low back pain with left-sided sciatica  - ok to continue ibuprofen/tylenol as needed   - further recommendations based on imaging results.   - Physical Therapy Referral - [place] Locations  - XR LUMBAR SPINE (MIN 4 VIEWS) (CPT=72110); Future    2. Verruca vulgaris  - Derm Referral - In Network      The patient indicates understanding of these issues and agrees to the plan.  Return for if symptoms do not resolve.       [1] No Known Allergies

## 2024-10-23 ENCOUNTER — OFFICE VISIT (OUTPATIENT)
Dept: DERMATOLOGY CLINIC | Facility: CLINIC | Age: 44
End: 2024-10-23
Payer: COMMERCIAL

## 2024-10-23 DIAGNOSIS — B07.9 VERRUCA VULGARIS: Primary | ICD-10-CM

## 2024-10-23 PROCEDURE — 99203 OFFICE O/P NEW LOW 30 MIN: CPT | Performed by: STUDENT IN AN ORGANIZED HEALTH CARE EDUCATION/TRAINING PROGRAM

## 2024-10-23 NOTE — PROGRESS NOTES
October 23, 2024    New Patient    Referred by: Celina Drew APRN     CHIEF COMPLAINT: Lesion of concern     HISTORY OF PRESENT ILLNESS: Andres Hurt is a 44 year old male here for evaluation of lesion of concern.    Growth   Location: B/L Pointer Fingers  Duration: Years   Signs and symptoms: comes and goes  Current treatment: None  Past treatments: None       Personal Dermatologic History  History of skin cancer: No  History of  atypical moles: No    FAMILY HISTORY:  History of melanoma: No    Past Medical History  No past medical history on file.    REVIEW OF SYSTEMS:  Constitutional: Denies fever, chills, unintentional weight loss.   Skin as per HPI    Medications  Current Outpatient Medications   Medication Sig Dispense Refill    ergocalciferol 1.25 MG (55033 UT) Oral Cap Take 1 capsule (50,000 Units total) by mouth once a week. 12 capsule 3       PHYSICAL EXAM:  General: awake, alert, no acute distress  Neuropsych: appropriate mood and affect  Eyes: Sclerae anicteric, without conjunctival injection, eyelids unremarkable  Skin: Skin exam was performed today including the following: fingers. Pertinent findings include:   - with verrucous papules    ASSESSMENT & PLAN:  Pathophysiology of diagnoses discussed with patient.  Therapeutic options reviewed. Risks, benefits, and alternatives discussed with patient. Instructions reviewed at length.    #Verruca Vulgaris  - Start Efudex+salicylic acid compound to affected arear one nightly. Sent to [facility] pharmacy    - Cryotherapy today. Medically necessary as lesion inflamed and irritated.    - Cryosurgery of non-malignant lesion(s)  - Risks, benefits, alternatives and personnel required for cryosurgery reviewed with patient. Discussed the expected redness, as well as the risks of swelling, blistering, crusting, pigmentary changes, and permanent scarring. . Discussed that lesion may need additional treatments in future or may recur. Pt verbalizes  understanding and wishes to proceed.   - Cryosurgery performed with Liquid Nitrogen via cryostat spray gun to wart(s). 7 lesion(s) treated.   - Patient tolerated well and wound care discussed.        Return to clinic: 6 weeks or sooner if something concerning arises     Jeff Reyes MD

## 2024-11-06 ENCOUNTER — TELEPHONE (OUTPATIENT)
Dept: PHYSICAL THERAPY | Facility: HOSPITAL | Age: 44
End: 2024-11-06

## 2024-11-07 ENCOUNTER — OFFICE VISIT (OUTPATIENT)
Dept: PHYSICAL THERAPY | Age: 44
End: 2024-11-07
Attending: NURSE PRACTITIONER
Payer: COMMERCIAL

## 2024-11-07 DIAGNOSIS — G89.29 CHRONIC LEFT-SIDED LOW BACK PAIN WITH LEFT-SIDED SCIATICA: Primary | ICD-10-CM

## 2024-11-07 DIAGNOSIS — M54.42 CHRONIC LEFT-SIDED LOW BACK PAIN WITH LEFT-SIDED SCIATICA: Primary | ICD-10-CM

## 2024-11-07 PROCEDURE — 97110 THERAPEUTIC EXERCISES: CPT

## 2024-11-07 PROCEDURE — 97161 PT EVAL LOW COMPLEX 20 MIN: CPT

## 2024-11-07 NOTE — PROGRESS NOTES
P.T. EVALUATION:   Referring Physician: Dr. Drew  Diagnosis: Chronic left-sided low back pain with left-sided sciatica (M54.42,G89.29)      Date of Onset: 2023 Date of Service: 11/7/2024     PATIENT SUMMARY   Patient verbalized consent for Physical Therapy evaluation and treatment.  Andres Hurt is a 44 year old y/o male who presents to therapy today with complaints of left sided sciatica  Pt describes pain level 5/10 at currently; 9-10/10 at worst  History of current condition: Patient reports pain began a long time ago, but would get better, and then return.  Pt reports pain has since gotten worse.  He reports his pain is located in the left gluteal region and travels to groin, lower leg at time.  He reports no symptoms on the RLE.  Pt had x-rays.    Current functional limitations include bending, prolonged standing, transfers, sit  Andres describes prior level of function independent  Pt goals include pain relief  Past medical history was reviewed with Andres. Patient  has no past medical history on file. Other co-morbidities include none   Social hx: Pt is an .  Patient works out at the gym regularly.       ASSESSMENT:   Patient presents to PT clinic due to left sided sciatica.  Patient demos decreased lumbar ROM, decreased LLE strength, intermittent altered LLE sensation, hypomobility of lumbar spine, and pain.  These impairments are functionally limiting pt's ability to bend, stand, sit, and transfer.  Andres would benefit from skilled Physical Therapy to address the above impairments to relieve pain.    Precautions:  None     OBJECTIVE:   Observation: pt ambulates into clinic independently    Sensation: intermittent tingling/numbness to left lower leg    AROM:   Lumbar ROM:  Flx: WNL (pain left back)  Ext: WNL (no pain)  Rot: R WNL, L WNL  Lat flx: R WNL, L WNL (stretch)    Accessory motion:   PA assessment:  Lumbar spine: slight hypomobility of lumbar spine noted; no pain  elicited     Flexibility:   HS: R WNL, L Min loss  Quads: R Min loss/WNL, L Min loss/WNL    Strength/MMT:   Hip flx: R 5/5, L 4/5  Hip ext: R 4/5, L 4-/5   Knee ext: R 5/5, L 4+/5  Knee flx: R 5/5, L 4/5  Ankle df: R 5/5, L 4/5    Posture: good sitting and standing posture; slightly guarded    Gait: pt ambulates into clinic independently    Functional Outcome Measure: Oswestry Disability Index Score  Score: (Patient-Rptd) 22 % (11/6/2024  6:47 PM)    Today’s Treatment and Response:  Patient education provided on PT eval findings, treatment plan, goals, HEP  Patient received there ex, HEP, education on anatomy of condition, centralization process  Charges: PT Eval, TE 2      Total Timed Treatment: 36 min     Total Treatment Time: 36 min      PT Eval Low Complexity     PLAN OF CARE:    Goals:    1- Pt will be I with maintenance and progression of HEP  2- Pt will demo increase in lumbar ROM to WNL to ease ability for pt to bend  3- Pt will demo I with proper body mechanics and technique to lift 20# box   4- Pt will report abolishment of LLE symptoms with prolonged standing  5- Pt will report abolishment of pain with transfers  6- Pt will demo I with proper sitting posture to be able to sit in car/truck without experiencing pain afterwards    Frequency / Duration: Patient will be seen for 1-2x/week or a total of 6-10 visits over a 90 day period. Treatment will include: Modalities prn, manual therapy, therapeutic exercises, therapeutic activity, and instructions on a home program.     Education or treatment limitation: None  Rehab Potential:good    Patient was advised of these findings, precautions, and treatment options and has agreed to actively participate in planning and for this course of care.    Thank you for your referral. Please co-sign or sign and return this letter via fax as soon as possible to [phone]. If you have any questions, please contact me at Dept: [phone]    Sincerely,  Electronically  signed by therapist: Michelle Bhatt PT, DPT    Physician's certification required: Yes  I certify the need for these services furnished under this plan of treatment and while under my care.    X___________________________________________________ Date____________________    Certification From: 11/7/2024  To:2/5/2025

## 2024-11-14 ENCOUNTER — OFFICE VISIT (OUTPATIENT)
Dept: PHYSICAL THERAPY | Age: 44
End: 2024-11-14
Attending: NURSE PRACTITIONER
Payer: COMMERCIAL

## 2024-11-14 PROCEDURE — 97014 ELECTRIC STIMULATION THERAPY: CPT

## 2024-11-14 PROCEDURE — 97110 THERAPEUTIC EXERCISES: CPT

## 2024-11-14 NOTE — PROGRESS NOTES
Diagnosis: Chronic left-sided low back pain with left-sided sciatica (M54.42,G89.29)           Next MD visit: none scheduled  Fall Risk: standard         Precautions: n/a          Medication Changes since last visit?:     Subjective:  Patient reports 6-7/10 low back, gluteal pain today.   He reports the exercises will give him relief, but the pain will come back after sitting for a period of time.  He reports his pain is most in the mornings.      Objective:     Date: 11/14/2024  Visit #: 2/5 (O) exp. 12/31/2024   HEP   -   Therapeutic Exercise   X 25 minutes  - retro walking on TM incline 8 (speed  0.8 mph) x 5 minutes  - supine R/L LTR 10x ea  - supine R/L SKTC 5x ea  - supine R/L piriformis stretch with towel & clinician assist 3 x 10 sec holds ea  - supine R/L hamstring stretch with strap 3 x 30 sec holds ea  - PPU 10x  - prone R/L quad stretch by clinician 3 x 15 sec holds ea   Manual Therapy   X 5 minutes  - prone lumbar spine mobilizations grade 3   Therapeutic Activity   -   Modalities   X 15 minutes  - estim/IFC with heat in prone               Assessment:  Session focused on lumbar spine mobility and LE flexibility.  Trial of estim/IFC for pain relief.        Plan: continue PT    Charges: Ex 2 Estim 1       Total Timed Treatment: 30 min  Total Treatment Time: 45 min

## 2024-11-19 ENCOUNTER — APPOINTMENT (OUTPATIENT)
Dept: PHYSICAL THERAPY | Age: 44
End: 2024-11-19
Attending: NURSE PRACTITIONER
Payer: COMMERCIAL

## 2024-11-20 ENCOUNTER — OFFICE VISIT (OUTPATIENT)
Dept: PHYSICAL THERAPY | Age: 44
End: 2024-11-20
Attending: NURSE PRACTITIONER
Payer: COMMERCIAL

## 2024-11-20 PROCEDURE — 97110 THERAPEUTIC EXERCISES: CPT

## 2024-11-20 PROCEDURE — 97014 ELECTRIC STIMULATION THERAPY: CPT

## 2024-11-20 NOTE — PROGRESS NOTES
Diagnosis: Chronic left-sided low back pain with left-sided sciatica (M54.42,G89.29)           Next MD visit: none scheduled  Fall Risk: standard         Precautions: n/a          Medication Changes since last visit?:     Subjective:  Patient reports 6-7/10 low back pain and left sided low back pain.  He reports the last few days he has been having 9/10 pain and he had to take ibuprofen.        Objective:     Date: 11/20/2024  Visit #: 3/5 (O) exp. 12/31/2024 Date: 11/14/2024  Visit #: 2/5 (O) exp. 12/31/2024   HEP    -   Therapeutic Exercise   X 35 minutes  - retro walking on TM incline 8 (speed 1.0 mph) x 6 minutes  - supine R LTR 10x ea  - supine R/L SKTC 5x ea  - supine R/L hamstring stretch with strap 3 x 30 sec holds ea  - PPU 10x  - repeat PPU 10x  - prone R/L quad stretch by clinician 3 x 20 sec holds ea  - prone R/L hip extension 2 x 10 ea X 25 minutes  - retro walking on TM incline 8 (speed  0.8 mph) x 5 minutes  - supine R/L LTR 10x ea  - supine R/L SKTC 5x ea  - supine R/L piriformis stretch with towel & clinician assist 3 x 10 sec holds ea  - supine R/L hamstring stretch with strap 3 x 30 sec holds ea  - PPU 10x  - prone R/L quad stretch by clinician 3 x 15 sec holds ea   Manual Therapy   X 5 minutes  - prone lumbar spine mobilizations grade 3 X 5 minutes  - prone lumbar spine mobilizations grade 3   Therapeutic Activity    -   Modalities   X 15 minutes  - estim/IFC with heat in prone X 15 minutes  - estim/IFC with heat in prone              Assessment:  Continued with lumbar extension based exercises.       Plan: continue PT    Charges: Ex 2 Estim 1       Total Timed Treatment: 40 min  Total Treatment Time: 55 min

## 2024-11-22 ENCOUNTER — OFFICE VISIT (OUTPATIENT)
Dept: PHYSICAL THERAPY | Age: 44
End: 2024-11-22
Attending: NURSE PRACTITIONER
Payer: COMMERCIAL

## 2024-11-22 PROCEDURE — 97110 THERAPEUTIC EXERCISES: CPT

## 2024-11-22 PROCEDURE — 97014 ELECTRIC STIMULATION THERAPY: CPT

## 2024-11-22 NOTE — PROGRESS NOTES
Diagnosis: Chronic left-sided low back pain with left-sided sciatica (M54.42,G89.29)           Next MD visit: none scheduled  Fall Risk: standard         Precautions: n/a          Medication Changes since last visit?: No    Subjective:  Patient reports he is feeling better today compared to earlier this week.  He reports 6/10 pain today.      Objective:     Date: 11/22/2024  Visit #: 4/5 (HMO) exp. 12/31/2024 Date: 11/20/2024  Visit #: 3/5 (HMO) exp. 12/31/2024 Date: 11/14/2024  Visit #: 2/5 (HMO) exp. 12/31/2024   HEP   - updated HEP - see pt instructions section  -   Therapeutic Exercise   X 38 minutes  - retro walking on TM incline 8 (speed 1.0 mph) x 8 minutes  - supine R LTR 10x ea  - supine R/L SKTC 5x ea  - supine R/L hamstring stretch with strap 3 x 30 sec holds ea  - supine bridges 2 x 10  - PPU 10x  - repeat PPU 10x  - prone R/L quad stretch by clinician 3 x 30 sec holds ea  - prone R/L hip extension 3 x 10 ea  - bird dog 2 x 10  - prone UE/LE alternating lifts 10x  - standing B gastroc stretch on slant board level 3 3 x 30 sec holds X 35 minutes  - retro walking on TM incline 8 (speed 1.0 mph) x 6 minutes  - supine R LTR 10x ea  - supine R/L SKTC 5x ea  - supine R/L hamstring stretch with strap 3 x 30 sec holds ea  - PPU 10x  - repeat PPU 10x  - prone R/L quad stretch by clinician 3 x 20 sec holds ea  - prone R/L hip extension 2 x 10 ea X 25 minutes  - retro walking on TM incline 8 (speed  0.8 mph) x 5 minutes  - supine R/L LTR 10x ea  - supine R/L SKTC 5x ea  - supine R/L piriformis stretch with towel & clinician assist 3 x 10 sec holds ea  - supine R/L hamstring stretch with strap 3 x 30 sec holds ea  - PPU 10x  - prone R/L quad stretch by clinician 3 x 15 sec holds ea   Manual Therapy   X 5 minutes  - prone lumbar spine mobilizations grade 3 X 5 minutes  - prone lumbar spine mobilizations grade 3 X 5 minutes  - prone lumbar spine mobilizations grade 3   Therapeutic Activity     -   Modalities   X 15  minutes  - estim/IFC with heat in prone X 15 minutes  - estim/IFC with heat in prone X 15 minutes  - estim/IFC with heat in prone               Assessment:  Initiated core strengthening therapeutic exercises with no increased pain.        Plan: continue PT    Charges: Ex 3 Estim 1       Total Timed Treatment: 43 min  Total Treatment Time: 58 min

## 2024-11-25 ENCOUNTER — OFFICE VISIT (OUTPATIENT)
Dept: PHYSICAL THERAPY | Age: 44
End: 2024-11-25
Attending: NURSE PRACTITIONER
Payer: COMMERCIAL

## 2024-11-25 PROCEDURE — 97110 THERAPEUTIC EXERCISES: CPT

## 2024-11-25 NOTE — PROGRESS NOTES
Diagnosis: Chronic left-sided low back pain with left-sided sciatica (M54.42,G89.29)           Next MD visit: none scheduled  Fall Risk: standard         Precautions: n/a          Medication Changes since last visit?: No    Subjective:  Patient reports he is feeling so much better.  He reports he took off work last week.  He reports only 3/10 pain today.      Objective:     Date: 11/25/2024  Visit #: 5/5 (HMO) exp. 12/31/2024 Date: 11/22/2024  Visit #: 4/5 (HMO) exp. 12/31/2024 Date: 11/20/2024  Visit #: 3/5 (HMO) exp. 12/31/2024   HEP    - updated HEP - see pt instructions section    Therapeutic Exercise   X 45 minutes  - retro walking on TM incline 8 (speed 1.0 mph) x 8 minutes  - supine R LTR 10x ea  - supine R/L SKTC 5x ea  - supine R/L hamstring stretch with strap - not today  - supine bridges 2 x 10  - supine R/L SL bridges 10x ea  - PPU 10x  - repeat PPU 10x  - prone R/L quad stretch by clinician 3 x 30 sec holds ea  - prone R/L hip extension 2# 2 x 10 ea  - prone B UE and LE lifts 10x  - standing B gastroc stretch on slant board level 3 3 x 30 sec holds  - standing lumbar extension 10x  - repeat standing lumbar extension 10x X 38 minutes  - retro walking on TM incline 8 (speed 1.0 mph) x 8 minutes  - supine R LTR 10x ea  - supine R/L SKTC 5x ea  - supine R/L hamstring stretch with strap 3 x 30 sec holds ea  - supine bridges 2 x 10  - PPU 10x  - repeat PPU 10x  - prone R/L quad stretch by clinician 3 x 30 sec holds ea  - prone R/L hip extension 3 x 10 ea  - bird dog 2 x 10  - prone UE/LE alternating lifts 10x  - standing B gastroc stretch on slant board level 3 3 x 30 sec holds X 35 minutes  - retro walking on TM incline 8 (speed 1.0 mph) x 6 minutes  - supine R LTR 10x ea  - supine R/L SKTC 5x ea  - supine R/L hamstring stretch with strap 3 x 30 sec holds ea  - PPU 10x  - repeat PPU 10x  - prone R/L quad stretch by clinician 3 x 20 sec holds ea  - prone R/L hip extension 2 x 10 ea   Manual Therapy    X 5  minutes  - prone lumbar spine mobilizations grade 3 X 5 minutes  - prone lumbar spine mobilizations grade 3   Therapeutic Activity        Modalities    X 15 minutes  - estim/IFC with heat in prone X 15 minutes  - estim/IFC with heat in prone               Assessment: Progressed gluteal and core strengthening interventions.        Plan: continue PT - Advance to standing core/hip strengthening next session as appropriate    Charges: Ex 3     Total Timed Treatment: 45 min  Total Treatment Time: 45 min

## 2024-11-26 ENCOUNTER — LAB ENCOUNTER (OUTPATIENT)
Dept: LAB | Age: 44
End: 2024-11-26
Attending: NURSE PRACTITIONER
Payer: COMMERCIAL

## 2024-11-26 ENCOUNTER — OFFICE VISIT (OUTPATIENT)
Dept: INTERNAL MEDICINE CLINIC | Facility: CLINIC | Age: 44
End: 2024-11-26
Payer: COMMERCIAL

## 2024-11-26 VITALS
HEIGHT: 65 IN | BODY MASS INDEX: 41.15 KG/M2 | DIASTOLIC BLOOD PRESSURE: 82 MMHG | WEIGHT: 247 LBS | RESPIRATION RATE: 16 BRPM | SYSTOLIC BLOOD PRESSURE: 122 MMHG | HEART RATE: 87 BPM

## 2024-11-26 DIAGNOSIS — Z87.19 HISTORY OF DIVERTICULOSIS: ICD-10-CM

## 2024-11-26 DIAGNOSIS — R35.0 FREQUENCY OF URINATION: ICD-10-CM

## 2024-11-26 DIAGNOSIS — R35.0 FREQUENCY OF URINATION: Primary | ICD-10-CM

## 2024-11-26 LAB
ALBUMIN SERPL-MCNC: 5 G/DL (ref 3.2–4.8)
ALBUMIN/GLOB SERPL: 1.5 {RATIO} (ref 1–2)
ALP LIVER SERPL-CCNC: 116 U/L
ALT SERPL-CCNC: 37 U/L
ANION GAP SERPL CALC-SCNC: 10 MMOL/L (ref 0–18)
AST SERPL-CCNC: 29 U/L (ref ?–34)
BASOPHILS # BLD AUTO: 0.01 X10(3) UL (ref 0–0.2)
BASOPHILS NFR BLD AUTO: 0.1 %
BILIRUB SERPL-MCNC: 0.6 MG/DL (ref 0.3–1.2)
BILIRUBIN: NEGATIVE
BUN BLD-MCNC: 15 MG/DL (ref 9–23)
BUN/CREAT SERPL: 13.9 (ref 10–20)
CALCIUM BLD-MCNC: 9.8 MG/DL (ref 8.7–10.4)
CHLORIDE SERPL-SCNC: 105 MMOL/L (ref 98–112)
CO2 SERPL-SCNC: 25 MMOL/L (ref 21–32)
CREAT BLD-MCNC: 1.08 MG/DL
DEPRECATED RDW RBC AUTO: 41 FL (ref 35.1–46.3)
EGFRCR SERPLBLD CKD-EPI 2021: 87 ML/MIN/1.73M2 (ref 60–?)
EOSINOPHIL # BLD AUTO: 0.07 X10(3) UL (ref 0–0.7)
EOSINOPHIL NFR BLD AUTO: 0.8 %
ERYTHROCYTE [DISTWIDTH] IN BLOOD BY AUTOMATED COUNT: 13.2 % (ref 11–15)
FASTING STATUS PATIENT QL REPORTED: YES
GLOBULIN PLAS-MCNC: 3.3 G/DL (ref 2–3.5)
GLUCOSE (URINE DIPSTICK): NEGATIVE MG/DL
GLUCOSE BLD-MCNC: 96 MG/DL (ref 70–99)
HCT VFR BLD AUTO: 46.1 %
HGB BLD-MCNC: 15.9 G/DL
IMM GRANULOCYTES # BLD AUTO: 0.03 X10(3) UL (ref 0–1)
IMM GRANULOCYTES NFR BLD: 0.3 %
KETONES (URINE DIPSTICK): NEGATIVE MG/DL
LEUKOCYTES: NEGATIVE
LYMPHOCYTES # BLD AUTO: 2.56 X10(3) UL (ref 1–4)
LYMPHOCYTES NFR BLD AUTO: 27.8 %
MCH RBC QN AUTO: 29.8 PG (ref 26–34)
MCHC RBC AUTO-ENTMCNC: 34.5 G/DL (ref 31–37)
MCV RBC AUTO: 86.3 FL
MONOCYTES # BLD AUTO: 0.69 X10(3) UL (ref 0.1–1)
MONOCYTES NFR BLD AUTO: 7.5 %
NEUTROPHILS # BLD AUTO: 5.84 X10 (3) UL (ref 1.5–7.7)
NEUTROPHILS # BLD AUTO: 5.84 X10(3) UL (ref 1.5–7.7)
NEUTROPHILS NFR BLD AUTO: 63.5 %
NITRITE, URINE: NEGATIVE
OSMOLALITY SERPL CALC.SUM OF ELEC: 291 MOSM/KG (ref 275–295)
PH, URINE: 7 (ref 4.5–8)
PLATELET # BLD AUTO: 315 10(3)UL (ref 150–450)
POTASSIUM SERPL-SCNC: 4.8 MMOL/L (ref 3.5–5.1)
PROT SERPL-MCNC: 8.3 G/DL (ref 5.7–8.2)
RBC # BLD AUTO: 5.34 X10(6)UL
SODIUM SERPL-SCNC: 140 MMOL/L (ref 136–145)
SPECIFIC GRAVITY: 1.01 (ref 1–1.03)
UROBILINOGEN,SEMI-QN: 0.2 MG/DL (ref 0–1.9)
WBC # BLD AUTO: 9.2 X10(3) UL (ref 4–11)

## 2024-11-26 PROCEDURE — 85025 COMPLETE CBC W/AUTO DIFF WBC: CPT

## 2024-11-26 PROCEDURE — 3074F SYST BP LT 130 MM HG: CPT | Performed by: NURSE PRACTITIONER

## 2024-11-26 PROCEDURE — 36415 COLL VENOUS BLD VENIPUNCTURE: CPT

## 2024-11-26 PROCEDURE — 3008F BODY MASS INDEX DOCD: CPT | Performed by: NURSE PRACTITIONER

## 2024-11-26 PROCEDURE — 80053 COMPREHEN METABOLIC PANEL: CPT

## 2024-11-26 PROCEDURE — 87086 URINE CULTURE/COLONY COUNT: CPT

## 2024-11-26 PROCEDURE — 99214 OFFICE O/P EST MOD 30 MIN: CPT | Performed by: NURSE PRACTITIONER

## 2024-11-26 PROCEDURE — 81002 URINALYSIS NONAUTO W/O SCOPE: CPT | Performed by: NURSE PRACTITIONER

## 2024-11-26 PROCEDURE — 3079F DIAST BP 80-89 MM HG: CPT | Performed by: NURSE PRACTITIONER

## 2024-11-26 NOTE — PROGRESS NOTES
Andres Hurt is a 44 year old male.  Chief Complaint   Patient presents with    Frequency     With pain     HPI:   He presents with dysuria and urinary frequency. He has had symptoms for about 3 weeks.     He did develop LLQ pain as well. He stopped eating and had just water for about 2-3 days and LLQ pain resolved.     Currently he is having abdominal tenderness.   Normal BM's. No diarrhea or constipation  He has a history of diverticulitis. No fevers. He is tolerating his diet.     He states when he develops urinary symptoms it is the first sign that he could be having a flare up of diverticulitis.     Declines screening for STI's.   No penile discharge or rash.   No testicular pain.   Current Outpatient Medications   Medication Sig Dispense Refill    ergocalciferol 1.25 MG (97604 UT) Oral Cap Take 1 capsule (50,000 Units total) by mouth once a week. 12 capsule 3      History reviewed. No pertinent past medical history.   History reviewed. No pertinent surgical history.   Social History:  Social History     Socioeconomic History    Marital status: Single   Tobacco Use    Smoking status: Never     Passive exposure: Never    Smokeless tobacco: Never   Vaping Use    Vaping status: Never Used   Substance and Sexual Activity    Alcohol use: Yes    Drug use: No   Other Topics Concern    Reaction to local anesthetic No      Family History   Problem Relation Age of Onset    No Known Problems Father     No Known Problems Mother     No Known Problems Maternal Grandmother     No Known Problems Maternal Grandfather     No Known Problems Paternal Grandmother     No Known Problems Paternal Grandfather     Other (Other) Sister         fibromyalgia       Allergies[1]     REVIEW OF SYSTEMS:     Review of Systems   Constitutional:  Negative for fever.   HENT: Negative.     Respiratory:  Negative for cough, shortness of breath and wheezing.    Cardiovascular:  Negative for chest pain.   Gastrointestinal:  Positive for abdominal  pain. Negative for constipation, diarrhea, nausea and vomiting.   Genitourinary:  Positive for dysuria and frequency. Negative for hematuria, penile discharge, penile pain and testicular pain.   Musculoskeletal: Negative.    Skin: Negative.    Neurological: Negative.    Psychiatric/Behavioral: Negative.        Wt Readings from Last 5 Encounters:   11/26/24 247 lb (112 kg)   10/15/24 247 lb (112 kg)   06/04/24 243 lb (110.2 kg)   05/14/24 239 lb (108.4 kg)   03/31/23 223 lb 12.8 oz (101.5 kg)     Body mass index is 41.1 kg/m².      EXAM:   /82 (BP Location: Right arm, Patient Position: Sitting, Cuff Size: large)   Pulse 87   Resp 16   Ht 5' 5\" (1.651 m)   Wt 247 lb (112 kg)   BMI 41.10 kg/m²     Physical Exam  Vitals reviewed.   Constitutional:       Appearance: Normal appearance.   HENT:      Head: Normocephalic.   Cardiovascular:      Rate and Rhythm: Normal rate and regular rhythm.      Pulses: Normal pulses.   Pulmonary:      Breath sounds: Normal breath sounds. No wheezing.   Abdominal:      General: Bowel sounds are normal.      Palpations: Abdomen is soft.      Tenderness: There is no abdominal tenderness.   Musculoskeletal:         General: No swelling. Normal range of motion.   Skin:     General: Skin is warm and dry.   Neurological:      Mental Status: He is alert and oriented to person, place, and time.   Psychiatric:         Mood and Affect: Mood normal.         Behavior: Behavior normal.            ASSESSMENT AND PLAN:   1. Frequency of urination  - will await urine culture results prior to starting antibiotics.   - POC Urinalysis, Manual Dip without microscopy [[id number]]  - Urine Culture, Routine [E]; Future    2. History of diverticulosis  - follow a bland diet   - CBC With Differential With Platelet; Future  - Comp Metabolic Panel (14); Future  - recent CT scan of the abdomen in July 2024 which showed No acute CT abnormality.  There is diverticulosis in the descending and sigmoid colon without  acute inflammation.     Total time spent with patient and reviewing the chart: 30 minutes     The patient indicates understanding of these issues and agrees to the plan.  Return for if symptoms do not resolve.         [1] No Known Allergies

## 2024-11-27 ENCOUNTER — OFFICE VISIT (OUTPATIENT)
Dept: PHYSICAL THERAPY | Age: 44
End: 2024-11-27
Attending: NURSE PRACTITIONER
Payer: COMMERCIAL

## 2024-11-27 PROCEDURE — 97110 THERAPEUTIC EXERCISES: CPT

## 2024-11-27 NOTE — PROGRESS NOTES
Diagnosis: Chronic left-sided low back pain with left-sided sciatica (M54.42,G89.29)           Next MD visit: none scheduled  Fall Risk: standard         Precautions: n/a          Medication Changes since last visit?: No    Subjective:  Patient reports feeling good after last session.  He reports 4/10 low back pain today.    Objective:     Date: 11/27/2024  Visit #: 6/10 (HMO) exp. 12/31/2024 Date: 11/25/2024  Visit #: 5/5 (HMO) exp. 12/31/2024 Date: 11/22/2024  Visit #: 4/5 (HMO) exp. 12/31/2024   HEP   - verbal issue PPU with belt/sheet OP  - updated HEP - see pt instructions section   Therapeutic Exercise   X 43 minutes  - retro walking on TM incline 8 (speed 1.0 mph) x 8 minutes  - supine R/L hamstring stretch with strap 3 x 30 sec holds ea  - supine bridges 3 x 10  - PPU with belt OP 2 x 10  - PPU 1 x 10  - prone R/L hip extension 2# 2 x 10 ea  - bird dog 1 x 10  - bird dog with 2# ankle wts & 5# DB 1 x 10   - standing B gastroc stretch on slant board level 3 3 x 30 sec holds  - standing lumbar extension 10x  - R/L forward step ups to 16 inch box 2 x 10 ea X 45 minutes  - retro walking on TM incline 8 (speed 1.0 mph) x 8 minutes  - supine R LTR 10x ea  - supine R/L SKTC 5x ea  - supine R/L hamstring stretch with strap - not today  - supine bridges 2 x 10  - supine R/L SL bridges 10x ea  - PPU 10x  - repeat PPU 10x  - prone R/L quad stretch by clinician 3 x 30 sec holds ea  - prone R/L hip extension 2# 2 x 10 ea  - prone B UE and LE lifts 10x  - standing B gastroc stretch on slant board level 3 3 x 30 sec holds  - standing lumbar extension 10x  - repeat standing lumbar extension 10x X 38 minutes  - retro walking on TM incline 8 (speed 1.0 mph) x 8 minutes  - supine R LTR 10x ea  - supine R/L SKTC 5x ea  - supine R/L hamstring stretch with strap 3 x 30 sec holds ea  - supine bridges 2 x 10  - PPU 10x  - repeat PPU 10x  - prone R/L quad stretch by clinician 3 x 30 sec holds ea  - prone R/L hip extension 3 x 10  ea  - bird dog 2 x 10  - prone UE/LE alternating lifts 10x  - standing B gastroc stretch on slant board level 3 3 x 30 sec holds   Manual Therapy     X 5 minutes  - prone lumbar spine mobilizations grade 3   Therapeutic Activity        Modalities     X 15 minutes  - estim/IFC with heat in prone               Assessment: Progressed lumbar extension force via PPU with belt OP.  Advanced core and gluteal strengthening interventions via bird dog with resistance and step ups.        Plan: continue PT     Charges: Ex 3     Total Timed Treatment: 43 min  Total Treatment Time: 43 min

## 2024-12-02 ENCOUNTER — OFFICE VISIT (OUTPATIENT)
Dept: PHYSICAL THERAPY | Age: 44
End: 2024-12-02
Attending: NURSE PRACTITIONER
Payer: COMMERCIAL

## 2024-12-02 PROCEDURE — 97140 MANUAL THERAPY 1/> REGIONS: CPT

## 2024-12-02 PROCEDURE — 97110 THERAPEUTIC EXERCISES: CPT

## 2024-12-02 NOTE — PROGRESS NOTES
Diagnosis: Chronic left-sided low back pain with left-sided sciatica (M54.42,G89.29)           Next MD visit: none scheduled  Fall Risk: standard         Precautions: n/a          Medication Changes since last visit?: No    Subjective:  Patient reports 5-6/10 left sided low back pain today.     Objective:    12/2/2024:   MMT:  Hip flx: R 5/5, L 4/5  Hip ext: R 5/5, L 4-/5  Knee ext: R 4+/5, L 4-/5  Knee flx: R 5/5, L 4-/5  Ankle df: R 5/5, L 4/5     Date: 12/2/2024  Visit #: 7/10 (HMO) exp. 12/31/2024 Date: 11/27/2024  Visit #: 6/10 (HMO) exp. 12/31/2024 Date: 11/25/2024  Visit #: 5/5 (HMO) exp. 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; increase PPU & lumbar extension to 4x/day - verbal issue PPU with belt/sheet OP    Therapeutic Exercise   X 38 minutes  - reassessment  - retro walking on TM incline 9 (speed 1.0 mph) x 8 minutes  - dead bug with 5# 2 x 10  - supine bridges 3 x 10  - PPU with belt OP 2 x 10  - PPU with L hip flexion 1 x 10  - prone R/L hip extension 2# 2 x 10 ea  - prone R/L quad stretch by clinician 3 x 20 sec holds ea  - standing lumbar extension 10x  - standing RSB up wall into lumbar extension 10x   X 43 minutes  - retro walking on TM incline 8 (speed 1.0 mph) x 8 minutes  - supine R/L hamstring stretch with strap 3 x 30 sec holds ea  - supine bridges 3 x 10  - PPU with belt OP 2 x 10  - PPU 1 x 10  - prone R/L hip extension 2# 2 x 10 ea  - bird dog 1 x 10  - bird dog with 2# ankle wts & 5# DB 1 x 10   - standing B gastroc stretch on slant board level 3 3 x 30 sec holds  - standing lumbar extension 10x  - R/L forward step ups to 16 inch box 2 x 10 ea X 45 minutes  - retro walking on TM incline 8 (speed 1.0 mph) x 8 minutes  - supine R LTR 10x ea  - supine R/L SKTC 5x ea  - supine R/L hamstring stretch with strap - not today  - supine bridges 2 x 10  - supine R/L SL bridges 10x ea  - PPU 10x  - repeat  PPU 10x  - prone R/L quad stretch by clinician 3 x 30 sec holds ea  - prone R/L hip extension 2# 2 x 10 ea  - prone B UE and LE lifts 10x  - standing B gastroc stretch on slant board level 3 3 x 30 sec holds  - standing lumbar extension 10x  - repeat standing lumbar extension 10x   Manual Therapy   - prone lumbar spine mobilizations grade 3 x 8 minutes     Therapeutic Activity        Modalities                    Assessment: Patient still with residual LLE weakness with reassessment.  Discussed with pt to increased lumbar extension stretches to 4x/day and to focus on proper body mechanics.        Plan: continue PT     Charges: Ex 3 Man 1    Total Timed Treatment: 48 min  Total Treatment Time: 48 min

## 2024-12-04 ENCOUNTER — OFFICE VISIT (OUTPATIENT)
Dept: PHYSICAL THERAPY | Age: 44
End: 2024-12-04
Attending: FAMILY MEDICINE
Payer: COMMERCIAL

## 2024-12-04 PROCEDURE — 97110 THERAPEUTIC EXERCISES: CPT

## 2024-12-04 NOTE — PROGRESS NOTES
Diagnosis: Chronic left-sided low back pain with left-sided sciatica (M54.42,G89.29)           Next MD visit: none scheduled  Fall Risk: standard         Precautions: n/a          Medication Changes since last visit?: No    Subjective:  Patient reports 5/10 left sided low back pain today.  He reports he set up an appointment with his MD for tomorrow (12/5/2024).      Objective:    12/2/2024:   MMT:  Hip flx: R 5/5, L 4/5  Hip ext: R 5/5, L 4-/5  Knee ext: R 4+/5, L 4-/5  Knee flx: R 5/5, L 4-/5  Ankle df: R 5/5, L 4/5    12/4/2024:  Lumbar ROM:  Flx: WNL (slight pain)  Ext: WNL  Rot: WNL  Lat flx: WNL     Date: 12/4/2024  Visit #: 8/10 (HMO) exp. 12/31/2024 Date: 12/2/2024  Visit #: 7/10 (HMO) exp. 12/31/2024 Date: 11/27/2024  Visit #: 6/10 (HMO) exp. 12/31/2024   HEP   - updated HEP - see pt instructions section X 2 minutes  - discussion of avoidance of forward flexion and to maintain proper body mechanics when caring for child; increase PPU & lumbar extension to 4x/day - verbal issue PPU with belt/sheet OP   Therapeutic Exercise   X 40 minutes  - reassessment  - retro walking on TM incline 9 (speed 1.0 mph) x 8 minutes  - dead bug with 5# 2 x 10  - dead bug with R/L hip iso push on knee 2 x 5 reps ea  - supine R/L SL bridges 10x ea  - supine R/L SKTC 5x ea 5 sec holds  - PPU 1 x 10  - PPU with belt 1 x 10  - prone superman 10x  - standing lumbar extension over mat table 10x  - standing B gastroc stretch on slant board level 3 3 x 30 sec holds  - standing squats with 10# at chest 10x  - standing squats 10x  - standing lumbar extension 10x  - R/L kneeling hip flexor stretch 3 x 15 sec holds ea  - R/L kneeling hip adductor stretch 3x 10 sec holds ea  - box lifts (18#) floor to table 5 reps  X 38 minutes  - reassessment  - retro walking on TM incline 9 (speed 1.0 mph) x 8 minutes  - dead bug with 5# 2 x 10  - supine bridges 3 x 10  - PPU with belt OP 2 x 10  - PPU with L hip flexion 1 x 10  - prone R/L hip extension  2# 2 x 10 ea  - prone R/L quad stretch by clinician 3 x 20 sec holds ea  - standing lumbar extension 10x  - standing RSB up wall into lumbar extension 10x   X 43 minutes  - retro walking on TM incline 8 (speed 1.0 mph) x 8 minutes  - supine R/L hamstring stretch with strap 3 x 30 sec holds ea  - supine bridges 3 x 10  - PPU with belt OP 2 x 10  - PPU 1 x 10  - prone R/L hip extension 2# 2 x 10 ea  - bird dog 1 x 10  - bird dog with 2# ankle wts & 5# DB 1 x 10   - standing B gastroc stretch on slant board level 3 3 x 30 sec holds  - standing lumbar extension 10x  - R/L forward step ups to 16 inch box 2 x 10 ea   Manual Therapy    - prone lumbar spine mobilizations grade 3 x 8 minutes    Therapeutic Activity        Modalities                    Assessment: Patient with reduced pain since start of PT, but still with residual LLE weakness and pt with prolonged sitting, bending activities.       Goals:  1- Pt will be I with maintenance and progression of HEP - IN PROGRESS   2- Pt will demo increase in lumbar ROM to WNL to ease ability for pt to bend - MET  3- Pt will demo I with proper body mechanics and technique to lift 20# box   4- Pt will report abolishment of LLE symptoms with prolonged standing -NEARLY MET  5- Pt will report abolishment of pain with transfers - NOT TESTED  6- Pt will demo I with proper sitting posture to be able to sit in car/truck without experiencing pain afterwards - NOT TESTED      Plan: pt to follow up with MD    Charges: Ex 3    Total Timed Treatment: 43 min  Total Treatment Time: 43 min

## 2024-12-05 ENCOUNTER — OFFICE VISIT (OUTPATIENT)
Dept: INTERNAL MEDICINE CLINIC | Facility: CLINIC | Age: 44
End: 2024-12-05
Payer: COMMERCIAL

## 2024-12-05 ENCOUNTER — HOSPITAL ENCOUNTER (OUTPATIENT)
Dept: CT IMAGING | Facility: HOSPITAL | Age: 44
Discharge: HOME | End: 2024-12-05
Attending: NURSE PRACTITIONER
Payer: COMMERCIAL

## 2024-12-05 VITALS
SYSTOLIC BLOOD PRESSURE: 117 MMHG | WEIGHT: 246 LBS | HEIGHT: 65 IN | BODY MASS INDEX: 40.98 KG/M2 | HEART RATE: 78 BPM | DIASTOLIC BLOOD PRESSURE: 73 MMHG

## 2024-12-05 DIAGNOSIS — M54.42 CHRONIC LEFT-SIDED LOW BACK PAIN WITH LEFT-SIDED SCIATICA: Primary | ICD-10-CM

## 2024-12-05 DIAGNOSIS — Z87.19 HISTORY OF DIVERTICULOSIS: ICD-10-CM

## 2024-12-05 DIAGNOSIS — G89.29 CHRONIC LEFT-SIDED LOW BACK PAIN WITH LEFT-SIDED SCIATICA: Primary | ICD-10-CM

## 2024-12-05 DIAGNOSIS — R10.30 LOWER ABDOMINAL PAIN: ICD-10-CM

## 2024-12-05 PROCEDURE — 99214 OFFICE O/P EST MOD 30 MIN: CPT | Performed by: NURSE PRACTITIONER

## 2024-12-05 PROCEDURE — 3074F SYST BP LT 130 MM HG: CPT | Performed by: NURSE PRACTITIONER

## 2024-12-05 PROCEDURE — 3078F DIAST BP <80 MM HG: CPT | Performed by: NURSE PRACTITIONER

## 2024-12-05 PROCEDURE — 74177 CT ABD & PELVIS W/CONTRAST: CPT | Performed by: NURSE PRACTITIONER

## 2024-12-05 PROCEDURE — 3008F BODY MASS INDEX DOCD: CPT | Performed by: NURSE PRACTITIONER

## 2024-12-05 RX ORDER — METRONIDAZOLE 500 MG/1
500 TABLET ORAL 3 TIMES DAILY
Qty: 30 TABLET | Refills: 0 | Status: SHIPPED | OUTPATIENT
Start: 2024-12-05 | End: 2024-12-15

## 2024-12-05 RX ORDER — CIPROFLOXACIN 500 MG/1
500 TABLET, FILM COATED ORAL 2 TIMES DAILY
Qty: 20 TABLET | Refills: 0 | Status: SHIPPED | OUTPATIENT
Start: 2024-12-05 | End: 2024-12-15

## 2024-12-05 NOTE — PROGRESS NOTES
Andres Hurt is a 44 year old male.  Chief Complaint   Patient presents with    Back Pain     Finished PT Still having back pain     HPI:   Patient presents for follow up. He has a history of chronic left sided back pain that radiates down his left leg. He has had the pain for about 2 years. He had a work injury about 2 years ago. He finished PT yesterday. He states his symptoms are improved for a day, when he has therapy but then the next day the symptoms return.     He currently rates the pain a 5/10. He has cut down on his use of Advil. He is using tylenol for pain.     He is having lower abdominal pain. The pain started yesterday. The area is tender to the touch. No nausea or vomiting. No changes to his stool. Last BM this morning. He has a history of diverticulosis. He has never had a colonoscopy. He had a CT scan of his abdomen in June which showed no acute abnormality.     He had juice this morning. He had a burrito yesterday around 7. He does not have increased pain with eating. No fevers.       Current Outpatient Medications   Medication Sig Dispense Refill    ergocalciferol 1.25 MG (49969 UT) Oral Cap Take 1 capsule (50,000 Units total) by mouth once a week. 12 capsule 3      History reviewed. No pertinent past medical history.   History reviewed. No pertinent surgical history.   Social History:  Social History     Socioeconomic History    Marital status: Single   Tobacco Use    Smoking status: Never     Passive exposure: Never    Smokeless tobacco: Never   Vaping Use    Vaping status: Never Used   Substance and Sexual Activity    Alcohol use: Yes    Drug use: No   Other Topics Concern    Reaction to local anesthetic No      Family History   Problem Relation Age of Onset    No Known Problems Father     No Known Problems Mother     No Known Problems Maternal Grandmother     No Known Problems Maternal Grandfather     No Known Problems Paternal Grandmother     No Known Problems Paternal Grandfather      Other (Other) Sister         fibromyalgia       Allergies[1]     REVIEW OF SYSTEMS:     Review of Systems   Constitutional:  Negative for fever.   HENT: Negative.     Respiratory:  Negative for cough, shortness of breath and wheezing.    Gastrointestinal:  Positive for abdominal pain. Negative for constipation, diarrhea, nausea and vomiting.   Genitourinary: Negative.    Musculoskeletal:  Positive for back pain.   Skin: Negative.    Neurological: Negative.    Psychiatric/Behavioral: Negative.        Wt Readings from Last 5 Encounters:   12/05/24 246 lb (111.6 kg)   11/26/24 247 lb (112 kg)   10/15/24 247 lb (112 kg)   06/04/24 243 lb (110.2 kg)   05/14/24 239 lb (108.4 kg)     Body mass index is 40.94 kg/m².      EXAM:   /73 (BP Location: Right arm, Patient Position: Sitting, Cuff Size: large)   Pulse 78   Ht 5' 5\" (1.651 m)   Wt 246 lb (111.6 kg)   BMI 40.94 kg/m²     Physical Exam  Vitals reviewed.   Constitutional:       Appearance: Normal appearance.   HENT:      Head: Normocephalic.   Cardiovascular:      Rate and Rhythm: Normal rate and regular rhythm.      Pulses: Normal pulses.   Pulmonary:      Breath sounds: Normal breath sounds. No wheezing.   Abdominal:      General: Bowel sounds are normal.      Palpations: Abdomen is soft.      Tenderness: There is abdominal tenderness.       Musculoskeletal:         General: No swelling.      Cervical back: Normal range of motion and neck supple.      Lumbar back: Decreased range of motion.        Back:    Skin:     General: Skin is warm and dry.   Neurological:      Mental Status: He is alert and oriented to person, place, and time.   Psychiatric:         Mood and Affect: Mood normal.         Behavior: Behavior normal.            ASSESSMENT AND PLAN:   1. Chronic left-sided low back pain with left-sided sciatica  - avoid NSAIDS due to abdominal pain. Ok to take tylenol.   - lumbar spine x-ray Mild degenerative changes of the lumbar spine with relative  preservation of the intervertebral disc spaces.   - MRI SPINE LUMBAR (CPT=72148); Future  - Physiatry Referral - [facility])    2. Lower abdominal pain  - dw patient to stay NPO until imaging is completed.   - if imaging is normal will have patient see GI  - CT ABDOMEN+PELVIS(CONTRAST ONLY)(CPT=74177); Future    3. History of diverticulosis  - CMP and CBC completed on 11/26 which was WNL  - CT ABDOMEN+PELVIS(CONTRAST ONLY)(CPT=74177); Future      The patient indicates understanding of these issues and agrees to the plan.  Return for if symptoms do not resolve.       [1] No Known Allergies

## 2024-12-18 ENCOUNTER — HOSPITAL ENCOUNTER (OUTPATIENT)
Dept: MRI IMAGING | Age: 44
Discharge: HOME | End: 2024-12-18
Attending: NURSE PRACTITIONER
Payer: COMMERCIAL

## 2024-12-18 DIAGNOSIS — M54.42 CHRONIC LEFT-SIDED LOW BACK PAIN WITH LEFT-SIDED SCIATICA: ICD-10-CM

## 2024-12-18 DIAGNOSIS — G89.29 CHRONIC LEFT-SIDED LOW BACK PAIN WITH LEFT-SIDED SCIATICA: ICD-10-CM

## 2024-12-18 PROCEDURE — 72148 MRI LUMBAR SPINE W/O DYE: CPT | Performed by: NURSE PRACTITIONER

## 2024-12-20 ENCOUNTER — APPOINTMENT (OUTPATIENT)
Dept: PHYSICAL THERAPY | Age: 44
End: 2024-12-20
Attending: NURSE PRACTITIONER
Payer: COMMERCIAL

## 2025-01-30 ENCOUNTER — TELEPHONE (OUTPATIENT)
Dept: CASE MANAGEMENT | Age: 45
End: 2025-01-30

## 2025-01-30 DIAGNOSIS — M54.42 CHRONIC LEFT-SIDED LOW BACK PAIN WITH LEFT-SIDED SCIATICA: Primary | ICD-10-CM

## 2025-01-30 DIAGNOSIS — G89.29 CHRONIC LEFT-SIDED LOW BACK PAIN WITH LEFT-SIDED SCIATICA: Primary | ICD-10-CM

## 2025-01-30 NOTE — TELEPHONE ENCOUNTER
Dr. Brewster,     Patient called requesting referral to Dr. Morgan for additional future visits.     Pended referral please review diagnosis and sign off if you agree.    Thank you.  Kay Moreno  [facility]

## 2025-02-03 ENCOUNTER — OFFICE VISIT (OUTPATIENT)
Dept: PHYSICAL MEDICINE AND REHAB | Facility: CLINIC | Age: 45
End: 2025-02-03
Payer: COMMERCIAL

## 2025-02-03 VITALS — BODY MASS INDEX: 36.65 KG/M2 | HEIGHT: 65 IN | WEIGHT: 220 LBS

## 2025-02-03 DIAGNOSIS — M51.369 DEGENERATION OF INTERVERTEBRAL DISC OF LUMBAR REGION, UNSPECIFIED WHETHER PAIN PRESENT: Primary | ICD-10-CM

## 2025-02-03 PROCEDURE — 99204 OFFICE O/P NEW MOD 45 MIN: CPT | Performed by: PHYSICAL MEDICINE & REHABILITATION

## 2025-02-03 PROCEDURE — 3008F BODY MASS INDEX DOCD: CPT | Performed by: PHYSICAL MEDICINE & REHABILITATION

## 2025-02-03 NOTE — PROGRESS NOTES
[facility]  Progress Note    CHIEF COMPLAINT:    Chief Complaint   Patient presents with    Low Back Pain     New right handed patient c/o aching left sided low back pain that is occasionally sharp, radiating posterior down his left leg to his calf that started last summer which he thinks was triggered at work. Denies N/T. Did PT with some relief, has lumbar xray 10/15/24 and lumbar MRI 12/18/24. Was taking motrin with minimal relief, continues stretches with some relief. LOP 4/10          History of Present Illness:  Andres Hurt is a 44 year old male who is being seen in consultation at the request of Dr. Andrew Brewster.  He has been seeing Michelle Bhatt for physical therapy and Celina Drew For chronic low back and left leg pain after work injury 2 years ago.  A recent lumbar MRI was obtained.  He also had recent lower abdominal pain indicating possible diverticulitis and placed on antibiotics.  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    PAST MEDICAL HISTORY:  No past medical history on file.    SURGICAL HISTORY:  No past surgical history on file.    SOCIAL HISTORY:   Social History     Occupational History    Not on file   Tobacco Use    Smoking status: Never     Passive exposure: Never    Smokeless tobacco: Never   Vaping Use    Vaping status: Never Used   Substance and Sexual Activity    Alcohol use: Not Currently     Alcohol/week: 5.0 standard drinks of alcohol     Types: 5 Cans of beer per week    Drug use: No    Sexual activity: Not on file       CURRENT MEDICATIONS:   Current Outpatient Medications   Medication Sig Dispense Refill    ergocalciferol 1.25 MG (87215 UT) Oral Cap Take 1 capsule (50,000 Units total) by mouth once a week. 12 capsule 3       ALLERGIES:   Allergies[1]    REVIEW OF SYSTEMS:   No patient-reported data collected this visit.            PHYSICAL EXAM:   Ht 65\"   Wt 220 lb (99.8  kg)   BMI 36.61 kg/m²     Body mass index is 36.61 kg/m².      General: No immediate distress  Head: Normocephalic/ Atraumatic  Extremities: No lower extremity edema bilaterally. Peripheral pulses intact.   Spine:  full and painfree lumbar ROM in all directions, nontender to palpation  Hips: full and painfree ROM   Neuro:   Cognition: alert & oriented x 3, attentive, able to follow 2 step commands, comprehention intact, spontaneous speech intact  Strength: Lower extremities have 5/5 strength  Sensation: Normal lower extremities  Reflexes: Normal lower extremities  SLR: neg    Data    Radiology Imaging:  I personally reviewed a lumbar MRI from September 2024 which was normal except a small midline L4-5 central disc protrusion which contacts but does not compress any of the neurological structures.      ASSESSMENT AND PLAN:  1. Degeneration of intervertebral disc of lumbar region, unspecified whether pain present  We went over his lumbar MRI films together.  There is a minor L4-5 disc bulge with no nerve root contact.  This is often seen in asymptomatic patients.  He has had physical therapy which has helped.  Encouraged him to continue with that.  I recommended enrolling in [employer] and to do regular aerobic activity 30 minutes/day long-term.  I reassured him that his back is anatomically stable and he can return to a degree of work that he wishes.  He will consider those options and return to see me as needed.            The patient was in agreement with the assessment and plan.  All questions were answered.        Vinayak Morgan MD  Physical Medicine and Rehabilitation/Sports Medicine  [facility]         [1] No Known Allergies

## 2025-07-14 ENCOUNTER — OFFICE VISIT (OUTPATIENT)
Dept: INTERNAL MEDICINE CLINIC | Facility: CLINIC | Age: 45
End: 2025-07-14
Payer: COMMERCIAL

## 2025-07-14 VITALS
SYSTOLIC BLOOD PRESSURE: 106 MMHG | DIASTOLIC BLOOD PRESSURE: 64 MMHG | WEIGHT: 229.19 LBS | OXYGEN SATURATION: 96 % | HEART RATE: 81 BPM | TEMPERATURE: 98 F | BODY MASS INDEX: 38.19 KG/M2 | HEIGHT: 65 IN

## 2025-07-14 DIAGNOSIS — M25.552 LEFT HIP PAIN: Primary | ICD-10-CM

## 2025-07-14 PROCEDURE — 99213 OFFICE O/P EST LOW 20 MIN: CPT | Performed by: NURSE PRACTITIONER

## 2025-07-14 PROCEDURE — 3008F BODY MASS INDEX DOCD: CPT | Performed by: NURSE PRACTITIONER

## 2025-07-14 PROCEDURE — 3074F SYST BP LT 130 MM HG: CPT | Performed by: NURSE PRACTITIONER

## 2025-07-14 PROCEDURE — 3078F DIAST BP <80 MM HG: CPT | Performed by: NURSE PRACTITIONER

## 2025-07-14 NOTE — PROGRESS NOTES
Andres Hurt is a 44 year old male.  Chief Complaint   Patient presents with    Hip Pain     Constand left hip pain for since February after rehab for back. Pain 6/10 left hip. Denies injuries.      HPI:   He presents with left hip pain. He rates the pain a 6/10. He states he has had pain for months.     He tries to exercise and the pain is not present during exercise but the day after he has pain.    He was having back pain which is resolved with seeing the chiropractor and doing physical therapy.     He takes tylenol or motrin as needed for pain.     He works sitting down on the computer.     He feels a clicking sensation to the hip when stretching.     Current Medications[1]   Past Medical History[2]   Past Surgical History[3]   Social History:  Short Social Hx on File[4]   Family History[5]   Allergies[6]     REVIEW OF SYSTEMS:     Review of Systems   Constitutional:  Negative for fever.   HENT: Negative.     Respiratory:  Negative for cough, shortness of breath and wheezing.    Cardiovascular:  Negative for chest pain.   Gastrointestinal: Negative.    Genitourinary: Negative.    Musculoskeletal:  Positive for arthralgias (left hip).   Skin: Negative.    Neurological: Negative.    Psychiatric/Behavioral: Negative.        Wt Readings from Last 5 Encounters:   07/14/25 229 lb 3.2 oz (104 kg)   02/03/25 220 lb (99.8 kg)   12/05/24 246 lb (111.6 kg)   11/26/24 247 lb (112 kg)   10/15/24 247 lb (112 kg)     Body mass index is 38.14 kg/m².      EXAM:   /64   Pulse 81   Temp 97.8 °F (36.6 °C)   Ht 5' 5\" (1.651 m)   Wt 229 lb 3.2 oz (104 kg)   SpO2 96%   BMI 38.14 kg/m²     Physical Exam  Vitals reviewed.   Constitutional:       Appearance: Normal appearance.   HENT:      Head: Normocephalic.   Cardiovascular:      Rate and Rhythm: Normal rate and regular rhythm.      Pulses: Normal pulses.   Pulmonary:      Breath sounds: Normal breath sounds. No wheezing.   Musculoskeletal:         General: No swelling.  Normal range of motion.      Left hip: Tenderness present. Normal range of motion.   Skin:     General: Skin is warm and dry.   Neurological:      Mental Status: He is alert and oriented to person, place, and time.   Psychiatric:         Mood and Affect: Mood normal.         Behavior: Behavior normal.            ASSESSMENT AND PLAN:   1. Left hip pain  - plan of care pending x-ray results  - dw patient to follow up with physiatry. He previously saw Dr Morgan for lower back pain.   - ok to continue ibuprofen or tylenol as needed   - XR HIP + PELVIS MIN 4 VIEWS LEFT (CPT=73503); Future      The patient indicates understanding of these issues and agrees to the plan.  No follow-ups on file.       [1]   No current outpatient medications on file.   [2] History reviewed. No pertinent past medical history.  [3] History reviewed. No pertinent surgical history.  [4]   Social History  Socioeconomic History    Marital status: Single   Tobacco Use    Smoking status: Never     Passive exposure: Never    Smokeless tobacco: Never   Vaping Use    Vaping status: Never Used   Substance and Sexual Activity    Alcohol use: Not Currently     Alcohol/week: 5.0 standard drinks of alcohol     Types: 5 Cans of beer per week    Drug use: No   Other Topics Concern    Reaction to local anesthetic No   [5]   Family History  Problem Relation Age of Onset    No Known Problems Father     No Known Problems Mother     No Known Problems Maternal Grandmother     No Known Problems Maternal Grandfather     No Known Problems Paternal Grandmother     No Known Problems Paternal Grandfather     Other (Other) Sister         fibromyalgia    [6] No Known Allergies

## 2025-07-18 ENCOUNTER — HOSPITAL ENCOUNTER (OUTPATIENT)
Dept: GENERAL RADIOLOGY | Facility: HOSPITAL | Age: 45
Discharge: HOME | End: 2025-07-18
Attending: NURSE PRACTITIONER
Payer: COMMERCIAL

## 2025-07-18 DIAGNOSIS — M25.552 LEFT HIP PAIN: ICD-10-CM

## 2025-07-18 PROCEDURE — 73502 X-RAY EXAM HIP UNI 2-3 VIEWS: CPT | Performed by: NURSE PRACTITIONER

## (undated) DIAGNOSIS — R30.9 PAINFUL URINATION: Primary | ICD-10-CM

## (undated) NOTE — LETTER
AUTHORIZATION FOR SURGICAL OPERATION OR OTHER PROCEDURE    1. I hereby authorize Dr. Cyn Salazar, and [facility] staff assigned to my case to perform the following operation and/or procedure at the [facility]:            SKIN TAG REMOVAL  _______________________________________________________________________________________________    2. My physician has explained the nature and purpose of the operation or other procedure, possible alternative methods of treatment, the risks involved, and the possibility of complication to me. I acknowledge that no guarantee has been made as to the result that may be obtained. 3.  I recognize that, during the course of this operation, or other procedure, unforseen conditions may necessitate additional or different procedure than those listed above. I, therefore, further authorize and request that the above named physician, his/her physician assistants or designees perform such procedures as are, in his/her professional opinion, necessary and desirable. 4.  Any tissue or organs removed in the operation or other procedure may be disposed of by and at the discretion of the [facility], [facility] and [facility]. 5.  I understand that in the event of a medical emergency, I will be transported by local paramedics to [facility] or other hospital emergency department. 6.  I certify that I have read and fully understand the above consent to operation and/or other procedure. 7.  I acknowledge that my physician has explained sedation/analgesia administration to me including the risks and benefits. I consent to the administration of sedation/analgesia as may be necessary or desirable in the judgement of my physician. Witness signature: ___________________________________________________ Date:  ______/______/_____                    Time:  ________ A. M.  P.M.        Patient Name: ______________________________________________________  (please print)      Patient signature:  ___________________________________________________             Relationship to Patient:           []  Parent    Responsible person                          []  Spouse  In case of minor or                    [] Other  _____________   Incompetent name:  __________________________________________________                               (please print)      _____________      Responsible person  In case of minor or  Incompetent signature:  _______________________________________________    Statement of Physician  My signature below affirms that prior to the time of the procedure, I have explained to the patient and/or his/her guardian, the risks and benefits involved in the proposed treatment and any reasonable alternative to the proposed treatment. I have also explained the risks and benefits involved in the refusal of the proposed treatment and have answered the patient's questions.                         Date:  ______/______/_______  Provider                      Signature:  __________________________________________________________       Time:  ___________ A.M    P.M.

## (undated) NOTE — Clinical Note
Dear Andrew,  I had the opportunity to see your patient Andres Hurt recently. I appreciate your confidence in me to care for your patients. Please feel free call me with any questions at [phone] or contact me through Epic.  Sincerely, Nikolay Morgan MD Board Certified, Physical Medicine and Rehabilitation Specializing in Sports Medicine, Spine Medicine and Electrodiagnostic Medicine [facility]

## (undated) NOTE — LETTER
WHERE IS YOUR PAIN NOW?  Diego the areas on your body where you feel the described sensations.  Use the appropriate symbol.  Diego the areas of radiation.  Include all affected areas.  Just to complete the picture, please draw in the face.     ACHE:  ^ ^ ^   NUMBNESS:  0000   PINS & NEEDLES:  = = = =                              ^ ^ ^                       0000              = = = =                                    ^ ^ ^                       0000            = = = =      BURNING:  XXXX   STABBING: ////                  XXXX                ////                         XXXX          ////     Please diego the line below indicating your degree of pain right now  with 0 being no pain 10 being the worst pain possible.                                         0             1             2              3             4              5              6              7             8             9             10         Patient Signature: